# Patient Record
Sex: MALE | Race: WHITE | NOT HISPANIC OR LATINO
[De-identification: names, ages, dates, MRNs, and addresses within clinical notes are randomized per-mention and may not be internally consistent; named-entity substitution may affect disease eponyms.]

---

## 2017-11-14 ENCOUNTER — APPOINTMENT (OUTPATIENT)
Dept: DERMATOLOGY | Facility: CLINIC | Age: 62
End: 2017-11-14

## 2017-11-30 ENCOUNTER — RX RENEWAL (OUTPATIENT)
Age: 62
End: 2017-11-30

## 2021-05-26 ENCOUNTER — NON-APPOINTMENT (OUTPATIENT)
Age: 66
End: 2021-05-26

## 2021-06-15 ENCOUNTER — APPOINTMENT (OUTPATIENT)
Dept: INTERNAL MEDICINE | Facility: CLINIC | Age: 66
End: 2021-06-15
Payer: MEDICARE

## 2021-06-15 VITALS
TEMPERATURE: 97.8 F | SYSTOLIC BLOOD PRESSURE: 120 MMHG | HEIGHT: 70 IN | BODY MASS INDEX: 28.49 KG/M2 | OXYGEN SATURATION: 97 % | DIASTOLIC BLOOD PRESSURE: 74 MMHG | HEART RATE: 81 BPM | WEIGHT: 199 LBS

## 2021-06-15 DIAGNOSIS — F12.90 CANNABIS USE, UNSPECIFIED, UNCOMPLICATED: ICD-10-CM

## 2021-06-15 DIAGNOSIS — F17.200 NICOTINE DEPENDENCE, UNSPECIFIED, UNCOMPLICATED: ICD-10-CM

## 2021-06-15 DIAGNOSIS — Z78.9 OTHER SPECIFIED HEALTH STATUS: ICD-10-CM

## 2021-06-15 DIAGNOSIS — M51.36 OTHER INTERVERTEBRAL DISC DEGENERATION, LUMBAR REGION: ICD-10-CM

## 2021-06-15 DIAGNOSIS — Z87.891 PERSONAL HISTORY OF NICOTINE DEPENDENCE: ICD-10-CM

## 2021-06-15 DIAGNOSIS — M54.30 SCIATICA, UNSPECIFIED SIDE: ICD-10-CM

## 2021-06-15 DIAGNOSIS — J39.8 OTHER SPECIFIED DISEASES OF UPPER RESPIRATORY TRACT: ICD-10-CM

## 2021-06-15 DIAGNOSIS — S42.301A UNSPECIFIED FRACTURE OF SHAFT OF HUMERUS, RIGHT ARM, INITIAL ENCOUNTER FOR CLOSED FRACTURE: ICD-10-CM

## 2021-06-15 DIAGNOSIS — Z09 ENCOUNTER FOR FOLLOW-UP EXAMINATION AFTER COMPLETED TREATMENT FOR CONDITIONS OTHER THAN MALIGNANT NEOPLASM: ICD-10-CM

## 2021-06-15 PROCEDURE — 99204 OFFICE O/P NEW MOD 45 MIN: CPT

## 2021-06-17 PROBLEM — M54.30 SCIATICA, UNSPECIFIED LATERALITY: Status: ACTIVE | Noted: 2021-06-17

## 2021-06-17 PROBLEM — Z78.9 ALCOHOL INGESTION: Status: ACTIVE | Noted: 2021-06-17

## 2021-06-17 PROBLEM — S42.301A RIGHT ARM FRACTURE: Status: RESOLVED | Noted: 2021-06-17 | Resolved: 2021-06-17

## 2021-06-17 PROBLEM — Z87.891 FORMER SMOKER: Status: ACTIVE | Noted: 2021-06-17

## 2021-06-17 PROBLEM — J39.8 TRACHEAL DEVIATION: Status: ACTIVE | Noted: 2021-06-17

## 2021-06-17 PROBLEM — M51.36 LUMBAR DEGENERATIVE DISC DISEASE: Status: ACTIVE | Noted: 2021-06-17

## 2021-06-17 PROBLEM — F12.90 USES MARIJUANA: Status: ACTIVE | Noted: 2021-06-17

## 2021-06-17 PROBLEM — J39.8 TRACHEAL STENOSIS: Status: ACTIVE | Noted: 2021-06-17

## 2021-06-17 RX ORDER — TADALAFIL 5 MG/1
5 TABLET ORAL
Refills: 0 | Status: ACTIVE | COMMUNITY

## 2021-06-17 NOTE — PHYSICAL EXAM
[No Acute Distress] : no acute distress [Well Nourished] : well nourished [Well Groomed] : well groomed [Well Developed] : well developed [Normal Oropharynx] : normal oropharynx [Supple] : supple [No JVD] : no jvd [Normal Rate/Rhythm] : normal rate/rhythm [Normal S1, S2] : normal s1, s2 [No Resp Distress] : no resp distress [No Acc Muscle Use] : no acc muscle use [Normal Rhythm and Effort] : normal rhythm and effort [Clear to Auscultation Bilaterally] : clear to auscultation bilaterally [Benign] : benign [Normal Gait] : normal gait [No Clubbing] : no clubbing [No Cyanosis] : no cyanosis [No Edema] : no edema [Normal Color/ Pigmentation] : normal color/ pigmentation [No Focal Deficits] : no focal deficits [Oriented x3] : oriented x3 [Normal Affect] : normal affect [TextBox_11] : No ST; PRL EOMI; TM's clear; wearing full face mask [TextBox_44] : no stridor [TextBox_54] : no cords [TextBox_68] : R=16; good air entry with no wheezing

## 2021-06-17 NOTE — PROCEDURE
[FreeTextEntry1] : See scanned chest CT report from 5/18/21 \par Reviewed in detail with patient and wife and all questions answered

## 2021-06-17 NOTE — REVIEW OF SYSTEMS
[Cough] : cough [SOB on Exertion] : sob on exertion [Chest Discomfort] : chest discomfort [Back Pain] : back pain [Negative] : Endocrine

## 2021-06-17 NOTE — HISTORY OF PRESENT ILLNESS
[Former] : former [>= 30 pack years] : >= 30 pack years [Never] : never [Current] : current [Cough] : coughing [Wheezing] : wheezing [Nasal Passage Blockage (Stuffiness)] : edema [Nonspecific Pain, Swelling, And Stiffness] : chest pain [Fever] : fever [Difficulty Breathing During Exertion] : dyspnea on exertion [Feelings Of Weakness On Exertion] : exercise intolerance [Does not check] : The patient is not checking peak flow at home [2  -  Slight] : 2, slight [Class II - Mild Symptoms and Slight Limitations] : II [With Patient/Caregiver] : With Patient/Caregiver [Designated Healthcare Proxy] : Designated healthcare proxy [Name: ___] : Health Care Proxy's Name: [unfilled]  [Relationship: ___] : Relationship: [unfilled] [Wt Gain ___ kg] : No recent weight gain [Wt Loss ___ kg] : No recent weight loss [More Frequent Use Needed Recently] : Patient reports no recent increase in frequency of [TextBox_4] : The patient is a 66-year-old man who recently had a baseline chest CT which was abnormal.  He is here today to review and discuss evaluation and management.\par He denies any history of pneumonia, pleurisy, bronchitis, asthma, emphysema, tuberculosis, lung cancer, pneumothorax, sleep apnea, or DVT/pulmonary embolism.  He denies any prior history of lung surgery.  He reports that his father had lung cancer.  The patient is  and retired.  He worked in the past as a  and then at [employer].  He denies any exposure to radiation or chemicals.  He thinks that he may have had exposure to dusts in his work at [employer].  The patient reports that he smoked at least 1 pack/day of cigarettes from age 13 until 1982.  He then discontinued smoking until 2001.  He then resumed smoking 1 pack/day of cigarettes for at least 5 to 6 years.  He then discontinued smoking again.  He drinks alcohol daily and does use marijuana.  He has five healthy cats and a dog in his home.  He has not had any recent travel.  He believes that his last chest x-ray was 25 years ago.  He has not had PFTs.  He reports that this is his first chest CT.  He did receive Covid vaccination but has not had influenza or pneumococcal vaccination.\par He reports that he was having left muscular chest pain.  His primary care physician decided to do a chest CT to evaluate the issue and because of his smoking history.  Presently he denies any chest pain.  He denies any significant cough.  He has not had hemoptysis.  He denies any weight loss.  He denies any recent fevers/chills/sweats.  He denies any calf pain.  He denies any edema, orthopnea, or PND.  He does notice reduced exercise tolerance and mild dyspnea with heavy exertion. [TextBox_11] : 1 [TextBox_13] : 30 [YearQuit] : 2007 [de-identified] : denies hemoptysis [ESS] : 0 [TextBox_42] : 05/21 [TextBox_52] : 1 [AdvancecareDate] : 06/21

## 2021-06-17 NOTE — REASON FOR VISIT
[Initial] : an initial visit [Abnormal CXR/ Chest CT] : an abnormal CXR/ chest CT [Shortness of Breath] : shortness of breath [Spouse] : spouse [TextBox_13] : Dr. Jasmine

## 2021-06-17 NOTE — CURRENT MEDS
Patient Education     The Flu (Influenza)     The virus that causes the flu spreads through the air in droplets when someone who has the flu coughs, sneezes, laughs, or talks.   The flu (influenza) is an infection that affects your respiratory tract. This tract is made up of your mouth, nose, and lungs, and the passages between them. Unlike a cold, the flu can make you very ill. And it can lead to pneumonia, a serious lung infection. The flu can have serious complications and even cause death.  Who is at risk for the flu?  Anyone can get the flu. But you are more likely to become infected if you:  · Have a weakened immune system  · Work in a healthcare setting where you may be exposed to flu germs  · Live or work with someone who has the flu  · Haven’t had an annual flu shot  How does the flu spread?  The flu is caused by a virus. The virus spreads through the air in droplets when someone who has the flu coughs, sneezes, laughs, or talks. You can become infected when you inhale these viruses directly. You can also become infected when you touch a surface on which the droplets have landed and then transfer the germs to your eyes, nose, or mouth. Touching used tissues, or sharing utensils, drinking glasses, or a toothbrush from an infected person can expose you to flu viruses, too.  What are the symptoms of the flu?  Flu symptoms tend to come on quickly and may last a few days to a few weeks. They include:  · Fever usually higher than 100.4°F  (38°C) and chills  · Sore throat and headache  · Dry cough  · Runny nose  · Tiredness and weakness  · Muscle aches  Who is at risk for flu complications?  For some people, the flu can be very serious. The risk for complications is greater for:  · Children younger than age 5  · Adults ages 65 and older  · People with a chronic illness such as diabetes or heart, kidney, or lung disease  · People who live in a nursing home or long-term care facility   How is the flu treated?  The  [Takes medication as prescribed] : takes flu usually gets better after 7 days or so. In some cases, your healthcare provider may prescribe an antiviral medicine. This may help you get well a little sooner. For the medicine to help, you need to take it as soon as possible (ideally within 48 hours) after your symptoms start. If you develop pneumonia or other serious illness, you may need to stay in the hospital.  Easing flu symptoms  · Drink lots of fluids such as water, juice, and warm soup. A good rule is to drink enough so that you urinate your normal amount.  · Get plenty of rest.  · Ask your healthcare provider what to take for fever and pain.  · Call your provider if your fever is 100.4°F (38°C) or higher, or you become dizzy, lightheaded, or short of breath.  Taking steps to protect others  · Wash your hands often, especially after coughing or sneezing. Or clean your hands with an alcohol-based hand  containing at least 60% alcohol.  · Cough or sneeze into a tissue. Then throw the tissue away and wash your hands. If you don’t have a tissue, cough and sneeze into your elbow.  · Stay home until at least 24 hours after you no longer have a fever or chills. Be sure the fever isn’t being hidden by fever-reducing medicine.  · Don’t share food, utensils, drinking glasses, or a toothbrush with others.  · Ask your healthcare provider if others in your household should get antiviral medicine to help them avoid infection.  How can the flu be prevented?  · One of the best ways to avoid the flu is to get a flu vaccine each year. The virus that causes the flu changes from year to year. For that reason, healthcare providers recommend getting the flu vaccine each year, as soon as it's available in your area. The vaccine is given as a shot. Your healthcare provider can tell you which vaccine is right for you. The nasal spray is not recommended for the 0777-1347 flu season. The CDC says the nasal spray did not seem to protect against the flu over the last  [None] : Patient does not have any barriers to medication adherence several flu seasons.  · Wash your hands often. Frequent handwashing is a proven way to help prevent infection.  · Carry an alcohol-based hand gel containing at least 60% alcohol. Use it when you can't use soap and water. Then wash your hands as soon as you can.  · Avoid touching your eyes, nose, and mouth.  · At home and work, clean phones, computer keyboards, and toys often with disinfectant wipes.  · If possible, avoid close contact with others who have the flu or symptoms of the flu.  Handwashing tips  Handwashing is one of the best ways to prevent many common infections. If you are caring for or visiting someone with the flu, wash your hands each time you enter and leave the room. Follow these steps:  · Use warm water and plenty of soap. Rub your hands together well.  · Clean the whole hand, including under your nails, between your fingers, and up the wrists.  · Wash for at least 15 seconds.  · Rinse, letting the water run down your fingers, not up your wrists.  · Dry your hands well. Use a paper towel to turn off the faucet and open the door.  Using alcohol-based hand   Alcohol-based hand  are also a good choice. Use them when you can't use soap and water. Follow these steps:  · Squeeze about a tablespoon of gel into the palm of one hand.  · Rub your hands together briskly, cleaning the backs of your hands, the palms, between your fingers, and up the wrists.  · Rub until the gel is gone and your hands are completely dry.  Preventing the flu in healthcare settings  The flu is a special concern for people in hospitals and long-term care facilities. To help prevent the spread of flu, many hospitals and nursing homes take these steps:  · Healthcare providers wash their hands or use an alcohol-based hand  before and after treating each patient.  · People with the flu have private rooms and bathrooms or share a room with someone with the same infection.  · People who are at high risk for the  [Yes] : Reviewed medication list for presence of high-risk medications. flu but don't have it are encouraged to get the flu and pneumonia vaccines.  · All healthcare workers are encouraged or required to get flu shots.   Date Last Reviewed: 12/1/2016  © 7211-0924 [employer]. [address] [place] [address], [place] [address]. All rights reserved. This information is not intended as a substitute for professional medical care. Always follow your healthcare professional's instructions.         follow up with Chauncey Chavarria MD as needed     [Muscle Relaxants] : muscle relaxants

## 2021-06-17 NOTE — ASSESSMENT
[FreeTextEntry1] : Multiple lung nodules\par ?  Etiology\par ?  Malignancy given strong smoking history\par ?  Postinfectious\par ?  Post occupational exposure\par No evidence of collagen vascular disease\par Will review chest CT CD with Dr. Hudson of radiology\par Schedule PET scan\par Follow-up chest CT in 3 months\par Collect any sputum for culture\par Q gold testing\par \par COPD/emphysema\par Evidence of emphysema on CT scanning\par Arrange full PFTs and 6-minute walk test\par No longer smokes\par Should have annual influenza vaccine/needs pneumococcal vaccination\par Had Covid vaccinations\par Pulmonary rehab\par Weight control\par Carry albuterol MDI\par Will likely need Trelegy Ellipta or Anoro Ellipta after PFT testing\par \par Has evidence of thyromegaly on chest CT triggering tracheal deviation and tracheal stenosis/stricture\par Doing ultrasound of thyroid\par Will likely need endocrine follow-up\par Check TFTs\par To do full PFTs to assess for upper  airway obstruction\par ENT evaluation\par May need surgical resection\par \par Esophageal dilatation\par Also has evidence of esophageal wall thickening\par Needs to see GI for upper endoscopy\par Will need follow-up of thyromegaly also causing esophageal deviation and compression\par May need evaluation for esophageal motility\par Antireflux diet and precautions\par Aspiration precautions\par Can use famotidine daily to twice daily\par \par Coronary artery calcifications\par ?  Significant CAD\par To see cardiology\par Should have echo and stress testing\par \par He will return in follow-up in 2 weeks and as needed\par He will call if his status worsens\par He will call for any pulmonary issues\par All of the above was discussed in detail with the patient and his wife and all of their questions were answered\par GI referrals were given at their request\par They verbally confirmed understanding of all of the above and agreement with the above plan

## 2021-06-18 ENCOUNTER — OUTPATIENT (OUTPATIENT)
Dept: OUTPATIENT SERVICES | Facility: HOSPITAL | Age: 66
LOS: 1 days | End: 2021-06-18
Payer: MEDICARE

## 2021-06-18 ENCOUNTER — APPOINTMENT (OUTPATIENT)
Dept: NUCLEAR MEDICINE | Facility: CLINIC | Age: 66
End: 2021-06-18
Payer: MEDICARE

## 2021-06-18 ENCOUNTER — TRANSCRIPTION ENCOUNTER (OUTPATIENT)
Age: 66
End: 2021-06-18

## 2021-06-18 DIAGNOSIS — Z90.89 ACQUIRED ABSENCE OF OTHER ORGANS: Chronic | ICD-10-CM

## 2021-06-18 DIAGNOSIS — J43.9 EMPHYSEMA, UNSPECIFIED: ICD-10-CM

## 2021-06-18 DIAGNOSIS — I86.1 SCROTAL VARICES: Chronic | ICD-10-CM

## 2021-06-18 DIAGNOSIS — R91.8 OTHER NONSPECIFIC ABNORMAL FINDING OF LUNG FIELD: ICD-10-CM

## 2021-06-18 PROCEDURE — 78815 PET IMAGE W/CT SKULL-THIGH: CPT | Mod: 26,PI,MH

## 2021-06-18 PROCEDURE — 78815 PET IMAGE W/CT SKULL-THIGH: CPT

## 2021-06-18 PROCEDURE — A9552: CPT

## 2021-06-22 ENCOUNTER — APPOINTMENT (OUTPATIENT)
Dept: INTERNAL MEDICINE | Facility: CLINIC | Age: 66
End: 2021-06-22
Payer: MEDICARE

## 2021-06-22 ENCOUNTER — NON-APPOINTMENT (OUTPATIENT)
Age: 66
End: 2021-06-22

## 2021-06-22 PROCEDURE — 99441: CPT | Mod: 95

## 2021-06-24 ENCOUNTER — APPOINTMENT (OUTPATIENT)
Dept: GASTROENTEROLOGY | Facility: CLINIC | Age: 66
End: 2021-06-24
Payer: MEDICARE

## 2021-06-24 VITALS
BODY MASS INDEX: 27.06 KG/M2 | SYSTOLIC BLOOD PRESSURE: 134 MMHG | WEIGHT: 189 LBS | HEART RATE: 95 BPM | HEIGHT: 70 IN | DIASTOLIC BLOOD PRESSURE: 90 MMHG

## 2021-06-24 DIAGNOSIS — K22.8 OTHER SPECIFIED DISEASES OF ESOPHAGUS: ICD-10-CM

## 2021-06-24 PROCEDURE — 99204 OFFICE O/P NEW MOD 45 MIN: CPT

## 2021-06-24 NOTE — ASSESSMENT
[FreeTextEntry1] : Abnormal PET scan and CAT scan suggesting erythema of the, esophagus. We'll schedule upper endoscopy. He has had acologard recently negative however as a smoker. He has been advised to consider a regular colonoscopy

## 2021-06-24 NOTE — HISTORY OF PRESENT ILLNESS
[FreeTextEntry1] : Patient recently had a CAT scan of his lungs for the purpose of screening as a ex-smoker and there was moderate mural thickening of portions of the esophagus. At that CT was then done, which also showed activity in the distal esophagus. He denies dysphasia. He denies regurgitation. He does eat close to that time and does not have significant heartburn. He denies any melena or weight loss

## 2021-06-28 ENCOUNTER — RESULT REVIEW (OUTPATIENT)
Age: 66
End: 2021-06-28

## 2021-06-28 ENCOUNTER — APPOINTMENT (OUTPATIENT)
Dept: GASTROENTEROLOGY | Facility: AMBULATORY MEDICAL SERVICES | Age: 66
End: 2021-06-28
Payer: MEDICARE

## 2021-06-28 PROCEDURE — 43239 EGD BIOPSY SINGLE/MULTIPLE: CPT

## 2021-07-09 ENCOUNTER — LABORATORY RESULT (OUTPATIENT)
Age: 66
End: 2021-07-09

## 2021-07-09 ENCOUNTER — APPOINTMENT (OUTPATIENT)
Dept: DISASTER EMERGENCY | Facility: CLINIC | Age: 66
End: 2021-07-09

## 2021-07-09 NOTE — ASU PATIENT PROFILE, ADULT - CENTRAL VENOUS CATHETER
Patient Education        Elevated Blood Pressure: Care Instructions  Your Care Instructions    Blood pressure is a measure of how hard the blood pushes against the walls of your arteries. It's normal for blood pressure to go up and down throughout the day. But if it stays up over time, you have high blood pressure. Two numbers tell you your blood pressure. The first number is the systolic pressure. It shows how hard the blood pushes when your heart is pumping. The second number is the diastolic pressure. It shows how hard the blood pushes between heartbeats, when your heart is relaxed and filling with blood. An ideal blood pressure in adults is less than 120/80 (say \"120 over 80\"). High blood pressure is 140/90 or higher. You have high blood pressure if your top number is 140 or higher or your bottom number is 90 or higher, or both. The main test for high blood pressure is simple, fast, and painless. To diagnose high blood pressure, your doctor will test your blood pressure at different times. After testing your blood pressure, your doctor may ask you to test it again when you are home. If you are diagnosed with high blood pressure, you can work with your doctor to make a long-term plan to manage it. Follow-up care is a key part of your treatment and safety. Be sure to make and go to all appointments, and call your doctor if you are having problems. It's also a good idea to know your test results and keep a list of the medicines you take. How can you care for yourself at home? · Do not smoke. Smoking increases your risk for heart attack and stroke. If you need help quitting, talk to your doctor about stop-smoking programs and medicines. These can increase your chances of quitting for good. · Stay at a healthy weight. · Try to limit how much sodium you eat to less than 2,300 milligrams (mg) a day. Your doctor may ask you to try to eat less than 1,500 mg a day. · Be physically active. Get at least 30 minutes of exercise on most days of the week. Walking is a good choice. You also may want to do other activities, such as running, swimming, cycling, or playing tennis or team sports. · Avoid or limit alcohol. Talk to your doctor about whether you can drink any alcohol. · Eat plenty of fruits, vegetables, and low-fat dairy products. Eat less saturated and total fats. · Learn how to check your blood pressure at home. When should you call for help? Call your doctor now or seek immediate medical care if:    · Your blood pressure is much higher than normal (such as 180/110 or higher).     · You think high blood pressure is causing symptoms such as:  ¨ Severe headache. ¨ Blurry vision.    Watch closely for changes in your health, and be sure to contact your doctor if:    · You do not get better as expected. Where can you learn more? Go to https://[employer]-fi.[employer]. org and sign in to your [employer] account. Enter L229 in the [employer] box to learn more about \"Elevated Blood Pressure: Care Instructions. \"     If you do not have an account, please click on the \"Sign Up Now\" link. Current as of: May 10, 2017  Content Version: 11.6  © 2947-4106 [employer]. Care instructions adapted under license by [facility] ([facility]). If you have questions about a medical condition or this instruction, always ask your healthcare professional. [place] [address] any warranty or liability for your use of this information.        Patient Education no

## 2021-07-09 NOTE — ASU PATIENT PROFILE, ADULT - PSH
S/P tonsillectomy  3y/o  Varicocele  right 1981   H/O inguinal hernia  2016  S/P tonsillectomy  5y/o  Varicocele  right 1981

## 2021-07-09 NOTE — ASU PATIENT PROFILE, ADULT - PMH
Benign prostatic hyperplasia    Coronary artery calcification    DDD (degenerative disc disease), lumbar    Emphysema lung    Esophageal dilatation    Inguinal hernia    Right arm fracture    Sciatica    Testosterone deficiency    Thyromegaly    Tracheal deviation    Tracheal stenosis

## 2021-07-12 ENCOUNTER — APPOINTMENT (OUTPATIENT)
Dept: GASTROENTEROLOGY | Facility: HOSPITAL | Age: 66
End: 2021-07-12
Payer: MEDICARE

## 2021-07-12 ENCOUNTER — OUTPATIENT (OUTPATIENT)
Dept: INPATIENT UNIT | Facility: HOSPITAL | Age: 66
LOS: 1 days | Discharge: HOME | End: 2021-07-12
Payer: MEDICARE

## 2021-07-12 VITALS
TEMPERATURE: 97 F | SYSTOLIC BLOOD PRESSURE: 108 MMHG | OXYGEN SATURATION: 98 % | RESPIRATION RATE: 16 BRPM | HEART RATE: 82 BPM | DIASTOLIC BLOOD PRESSURE: 72 MMHG

## 2021-07-12 VITALS
RESPIRATION RATE: 16 BRPM | HEART RATE: 81 BPM | WEIGHT: 188.5 LBS | SYSTOLIC BLOOD PRESSURE: 121 MMHG | TEMPERATURE: 97 F | DIASTOLIC BLOOD PRESSURE: 81 MMHG | HEIGHT: 70 IN | OXYGEN SATURATION: 100 %

## 2021-07-12 DIAGNOSIS — I86.1 SCROTAL VARICES: Chronic | ICD-10-CM

## 2021-07-12 DIAGNOSIS — Z90.89 ACQUIRED ABSENCE OF OTHER ORGANS: Chronic | ICD-10-CM

## 2021-07-12 DIAGNOSIS — K22.8 OTHER SPECIFIED DISEASES OF ESOPHAGUS: ICD-10-CM

## 2021-07-12 DIAGNOSIS — Z87.19 PERSONAL HISTORY OF OTHER DISEASES OF THE DIGESTIVE SYSTEM: Chronic | ICD-10-CM

## 2021-07-12 LAB
ANION GAP SERPL CALC-SCNC: 3 MMOL/L — LOW (ref 5–17)
BUN SERPL-MCNC: 15 MG/DL — SIGNIFICANT CHANGE UP (ref 7–23)
CALCIUM SERPL-MCNC: 9.5 MG/DL — SIGNIFICANT CHANGE UP (ref 8.5–10.1)
CHLORIDE SERPL-SCNC: 108 MMOL/L — SIGNIFICANT CHANGE UP (ref 96–108)
CO2 SERPL-SCNC: 27 MMOL/L — SIGNIFICANT CHANGE UP (ref 22–31)
CREAT SERPL-MCNC: 1.38 MG/DL — HIGH (ref 0.5–1.3)
GLUCOSE SERPL-MCNC: 106 MG/DL — HIGH (ref 70–99)
HCT VFR BLD CALC: 50.5 % — HIGH (ref 39–50)
HGB BLD-MCNC: 16.8 G/DL — SIGNIFICANT CHANGE UP (ref 13–17)
MCHC RBC-ENTMCNC: 29.4 PG — SIGNIFICANT CHANGE UP (ref 27–34)
MCHC RBC-ENTMCNC: 33.3 GM/DL — SIGNIFICANT CHANGE UP (ref 32–36)
MCV RBC AUTO: 88.4 FL — SIGNIFICANT CHANGE UP (ref 80–100)
PLATELET # BLD AUTO: 313 K/UL — SIGNIFICANT CHANGE UP (ref 150–400)
POTASSIUM SERPL-MCNC: 5.1 MMOL/L — SIGNIFICANT CHANGE UP (ref 3.5–5.3)
POTASSIUM SERPL-SCNC: 5.1 MMOL/L — SIGNIFICANT CHANGE UP (ref 3.5–5.3)
RBC # BLD: 5.71 M/UL — SIGNIFICANT CHANGE UP (ref 4.2–5.8)
RBC # FLD: 13.4 % — SIGNIFICANT CHANGE UP (ref 10.3–14.5)
SODIUM SERPL-SCNC: 138 MMOL/L — SIGNIFICANT CHANGE UP (ref 135–145)
WBC # BLD: 8.44 K/UL — SIGNIFICANT CHANGE UP (ref 3.8–10.5)
WBC # FLD AUTO: 8.44 K/UL — SIGNIFICANT CHANGE UP (ref 3.8–10.5)

## 2021-07-12 PROCEDURE — 85027 COMPLETE CBC AUTOMATED: CPT

## 2021-07-12 PROCEDURE — 80048 BASIC METABOLIC PNL TOTAL CA: CPT

## 2021-07-12 PROCEDURE — 43259 EGD US EXAM DUODENUM/JEJUNUM: CPT

## 2021-07-12 PROCEDURE — C9399: CPT

## 2021-07-12 PROCEDURE — 36415 COLL VENOUS BLD VENIPUNCTURE: CPT

## 2021-07-12 RX ORDER — SODIUM CHLORIDE 9 MG/ML
1000 INJECTION, SOLUTION INTRAVENOUS
Refills: 0 | Status: DISCONTINUED | OUTPATIENT
Start: 2021-07-12 | End: 2021-07-12

## 2021-07-12 RX ORDER — OXYCODONE HYDROCHLORIDE 5 MG/1
5 TABLET ORAL ONCE
Refills: 0 | Status: DISCONTINUED | OUTPATIENT
Start: 2021-07-12 | End: 2021-07-12

## 2021-07-12 RX ORDER — FENTANYL CITRATE 50 UG/ML
50 INJECTION INTRAVENOUS
Refills: 0 | Status: DISCONTINUED | OUTPATIENT
Start: 2021-07-12 | End: 2021-07-12

## 2021-07-12 RX ORDER — ONDANSETRON 8 MG/1
4 TABLET, FILM COATED ORAL ONCE
Refills: 0 | Status: DISCONTINUED | OUTPATIENT
Start: 2021-07-12 | End: 2021-07-12

## 2021-07-12 NOTE — BRIEF OPERATIVE NOTE - OPERATION/FINDINGS
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 On retroflexion and forward view it does not extend past 42 cm.   EUS stage T3N1.

## 2021-07-13 ENCOUNTER — APPOINTMENT (OUTPATIENT)
Dept: INTERNAL MEDICINE | Facility: CLINIC | Age: 66
End: 2021-07-13
Payer: MEDICARE

## 2021-07-13 VITALS
BODY MASS INDEX: 28.58 KG/M2 | SYSTOLIC BLOOD PRESSURE: 100 MMHG | WEIGHT: 193 LBS | TEMPERATURE: 98 F | OXYGEN SATURATION: 97 % | HEART RATE: 82 BPM | DIASTOLIC BLOOD PRESSURE: 68 MMHG | HEIGHT: 69 IN

## 2021-07-13 DIAGNOSIS — I25.84 ATHEROSCLEROTIC HEART DISEASE OF NATIVE CORONARY ARTERY W/OUT ANGINA PECTORIS: ICD-10-CM

## 2021-07-13 DIAGNOSIS — I25.10 ATHEROSCLEROTIC HEART DISEASE OF NATIVE CORONARY ARTERY W/OUT ANGINA PECTORIS: ICD-10-CM

## 2021-07-13 DIAGNOSIS — E01.0 IODINE-DEFICIENCY RELATED DIFFUSE (ENDEMIC) GOITER: ICD-10-CM

## 2021-07-13 DIAGNOSIS — R91.8 OTHER NONSPECIFIC ABNORMAL FINDING OF LUNG FIELD: ICD-10-CM

## 2021-07-13 PROCEDURE — 94726 PLETHYSMOGRAPHY LUNG VOLUMES: CPT

## 2021-07-13 PROCEDURE — 94010 BREATHING CAPACITY TEST: CPT

## 2021-07-13 PROCEDURE — 99214 OFFICE O/P EST MOD 30 MIN: CPT | Mod: 25

## 2021-07-13 PROCEDURE — 94729 DIFFUSING CAPACITY: CPT

## 2021-07-13 RX ORDER — LISDEXAMFETAMINE DIMESYLATE 70 MG/1
70 CAPSULE ORAL
Qty: 30 | Refills: 0 | Status: ACTIVE | COMMUNITY
Start: 2021-06-22

## 2021-07-13 RX ORDER — CYCLOBENZAPRINE HYDROCHLORIDE 10 MG/1
10 TABLET, FILM COATED ORAL
Refills: 0 | Status: DISCONTINUED | COMMUNITY
End: 2021-07-13

## 2021-07-14 PROBLEM — I25.10 CORONARY ARTERY CALCIFICATION: Status: ACTIVE | Noted: 2021-06-17

## 2021-07-14 PROBLEM — E01.0 THYROMEGALY: Status: ACTIVE | Noted: 2021-06-17

## 2021-07-14 PROBLEM — R91.8 MULTIPLE LUNG NODULES: Status: ACTIVE | Noted: 2021-06-15

## 2021-07-14 NOTE — CURRENT MEDS
[Takes medication as prescribed] : takes [None] : Patient does not have any barriers to medication adherence [Yes] : Reviewed medication list for presence of high-risk medications. [Muscle Relaxants] : muscle relaxants [Opioids] : opioids

## 2021-07-14 NOTE — PHYSICAL EXAM
[No Acute Distress] : no acute distress [Well Nourished] : well nourished [Well Groomed] : well groomed [Well Developed] : well developed [Supple] : supple [No JVD] : no jvd [Normal Rate/Rhythm] : normal rate/rhythm [Normal S1, S2] : normal s1, s2 [No Resp Distress] : no resp distress [No Acc Muscle Use] : no acc muscle use [Normal Rhythm and Effort] : normal rhythm and effort [Clear to Auscultation Bilaterally] : clear to auscultation bilaterally [Benign] : benign [Normal Gait] : normal gait [No Clubbing] : no clubbing [No Cyanosis] : no cyanosis [No Edema] : no edema [No Focal Deficits] : no focal deficits [Oriented x3] : oriented x3 [Normal Affect] : normal affect [TextBox_11] : No ST; PRL EOMI; TM's clear; wearing full face mask [TextBox_44] : no stridor [TextBox_54] : no cords [TextBox_68] : R=16; good air entry with no wheezing [TextBox_125] : Tanned

## 2021-07-14 NOTE — REASON FOR VISIT
[Abnormal CXR/ Chest CT] : an abnormal CXR/ chest CT [Shortness of Breath] : shortness of breath [Spouse] : spouse [Follow-Up] : a follow-up visit [TextBox_13] : Dr. Jasmine

## 2021-07-14 NOTE — PROCEDURE
[FreeTextEntry1] : See scanned chest CT report from 5/18/21 \par See scanned PET scan report\par See scanned PFT report\par Reviewed in detail with patient and wife and all questions answered

## 2021-07-14 NOTE — ASSESSMENT
[FreeTextEntry1] : Multiple lung nodules\par ?  Etiology\par ?  Malignancy given strong smoking history/ esophageal cancer\par ?  Postinfectious\par ?  Post occupational exposure\par No evidence of collagen vascular disease\par Will review chest CT CD with Dr. Hudson of radiology\par Had PET = nodules below resolution of scan\par Follow-up chest CT in 3 months\par Collect any sputum for culture\par Q gold testing\par \par COPD/emphysema\par Evidence of emphysema on CT scanning\par See scanned PFT's from today = copy of report given to patient\par To do PFT's at least annually\par No longer smokes\par Should have annual influenza vaccine/needs pneumococcal vaccination\par Had Covid vaccinations\par Pulmonary rehab\par Weight control\par Carry albuterol MDI\par Declines any standing therapy given normal PFT's\par \par Had evidence of thyromegaly on chest CT triggering tracheal deviation\par Found to have thyroid cyst on US - drained with resolution of tracheal/esophageal deviation as per patient\par PFT's done today with no sign of UAWO\par Endocrine follow-up\par Monitor thyroid US and TFT's\par \par Esophageal dilatation/esophageal wall thickening on CT and PET\par Saw GI\par Had EGD and EUS\par Now with esophageal cancer\par Chemo/RT/[place] [facility] [place]\par Antireflux diet and precautions\par Aspiration precautions\par On PPI as per GI\par \par Coronary artery calcifications\par ?  Significant CAD\par To see cardiology\par Should have echo and stress testing\par \par He will return in follow-up after his chest CT and as needed = can do TEB visit\par He will call if his status worsens\par He will call for any pulmonary issues\par All of the above was discussed in detail with the patient and his wife and all of their questions were answered\par They verbally confirmed understanding of all of the above and agreement with the above plan

## 2021-07-14 NOTE — REVIEW OF SYSTEMS
[SOB on Exertion] : sob on exertion [Chest Discomfort] : chest discomfort [Back Pain] : back pain [Negative] : Endocrine [Thyroid Problem] : thyroid problem

## 2021-07-14 NOTE — HISTORY OF PRESENT ILLNESS
[Former] : former [>= 30 pack years] : >= 30 pack years [Never] : never [Current] : current [Cough] : coughing [Wheezing] : wheezing [Nasal Passage Blockage (Stuffiness)] : edema [Nonspecific Pain, Swelling, And Stiffness] : chest pain [Fever] : fever [Does not check] : The patient is not checking peak flow at home [2  -  Slight] : 2, slight [Class II - Mild Symptoms and Slight Limitations] : II [Designated Healthcare Proxy] : Designated healthcare proxy [Name: ___] : Health Care Proxy's Name: [unfilled]  [Relationship: ___] : Relationship: [unfilled] [Difficulty Breathing During Exertion] : dyspnea on exertion [Feelings Of Weakness On Exertion] : exercise intolerance [Reviewed no changes] : Reviewed no changes [Wt Gain ___ kg] : No recent weight gain [Wt Loss ___ kg] : No recent weight loss [More Frequent Use Needed Recently] : Patient reports no recent increase in frequency of [TextBox_4] : INITIAL VISIT:\par The patient is a 66-year-old man who recently had a baseline chest CT which was abnormal.\par He denies any history of pneumonia, pleurisy, bronchitis, asthma, emphysema, tuberculosis, lung cancer, pneumothorax, sleep apnea, or DVT/pulmonary embolism.  He denies any prior history of lung surgery.  He reports that his father had lung cancer.  The patient is  and retired.  He worked in the past as a  and then at [employer].  He denies any exposure to radiation or chemicals.  He thinks that he may have had exposure to dusts in his work at [employer].  The patient reports that he smoked at least 1 pack/day of cigarettes from age 13 until 1982.  He then discontinued smoking until 2001.  He then resumed smoking 1 pack/day of cigarettes for at least 5 to 6 years.  He then discontinued smoking again.  He drinks alcohol daily and does use marijuana.  He has five healthy cats and a dog in his home.  He has not had any recent travel.  He believes that his last chest x-ray was 25 years ago.  He has not had PFTs.  He reports that this is his first chest CT.  He did receive Covid vaccination but has not had influenza or pneumococcal vaccination.\par He reports that he was having left muscular chest pain.  His primary care physician decided to do a chest CT to evaluate the issue and because of his smoking history.  Presently he denies any chest pain.  He denies any significant cough.  He has not had hemoptysis.  He denies any weight loss.  He denies any recent fevers/chills/sweats.  He denies any calf pain.  He denies any edema, orthopnea, or PND.  He does notice reduced exercise tolerance and mild dyspnea with heavy exertion.\par \par 7/13/21 F/U visit\par Recently saw endocrine.  [place] to have large thyroid cyst which was drained and no longer impinging upon esophagus or trachea.\par Had abnormal PET scan.  Was evaluated by GI.  Underwent upper endoscopy and endoscopic ultrasound.  Has been diagnosed with esophageal cancer.  Will be going to [facility] on Thursday.  Reports that he will need radiation and chemotherapy followed by surgical resection.\par His left chest pain was likely due to a rib fracture in that area.  He reports that his pain is improved.  He denies any cough or hemoptysis.  He denies any fevers.  He denies any edema, orthopnea, or PND.  He denies any new calf pain.  His dyspnea on exertion remains unchanged. [TextBox_11] : 1 [TextBox_13] : 30 [YearQuit] : 2007 [de-identified] : denies hemoptysis [ESS] : 0 [TextBox_42] : 05/21 [TextBox_52] : 1 [AdvancecareDate] : 07/21

## 2021-07-15 ENCOUNTER — APPOINTMENT (OUTPATIENT)
Dept: GASTROENTEROLOGY | Facility: CLINIC | Age: 66
End: 2021-07-15

## 2021-07-15 DIAGNOSIS — C15.9 MALIGNANT NEOPLASM OF ESOPHAGUS, UNSPECIFIED: ICD-10-CM

## 2021-07-15 DIAGNOSIS — K44.9 DIAPHRAGMATIC HERNIA WITHOUT OBSTRUCTION OR GANGRENE: ICD-10-CM

## 2021-07-15 DIAGNOSIS — I25.10 ATHEROSCLEROTIC HEART DISEASE OF NATIVE CORONARY ARTERY WITHOUT ANGINA PECTORIS: ICD-10-CM

## 2021-07-15 DIAGNOSIS — N40.1 BENIGN PROSTATIC HYPERPLASIA WITH LOWER URINARY TRACT SYMPTOMS: ICD-10-CM

## 2021-07-15 DIAGNOSIS — K22.8 OTHER SPECIFIED DISEASES OF ESOPHAGUS: ICD-10-CM

## 2021-07-15 DIAGNOSIS — J43.9 EMPHYSEMA, UNSPECIFIED: ICD-10-CM

## 2021-07-15 DIAGNOSIS — C16.0 MALIGNANT NEOPLASM OF CARDIA: ICD-10-CM

## 2021-07-15 DIAGNOSIS — Z87.891 PERSONAL HISTORY OF NICOTINE DEPENDENCE: ICD-10-CM

## 2021-07-15 DIAGNOSIS — I89.9 NONINFECTIVE DISORDER OF LYMPHATIC VESSELS AND LYMPH NODES, UNSPECIFIED: ICD-10-CM

## 2021-07-15 DIAGNOSIS — N13.8 OTHER OBSTRUCTIVE AND REFLUX UROPATHY: ICD-10-CM

## 2021-07-16 ENCOUNTER — APPOINTMENT (OUTPATIENT)
Dept: DISASTER EMERGENCY | Facility: CLINIC | Age: 66
End: 2021-07-16

## 2021-07-16 ENCOUNTER — LABORATORY RESULT (OUTPATIENT)
Age: 66
End: 2021-07-16

## 2021-07-16 ENCOUNTER — APPOINTMENT (OUTPATIENT)
Dept: GASTROENTEROLOGY | Facility: CLINIC | Age: 66
End: 2021-07-16

## 2021-07-16 PROBLEM — J39.8 OTHER SPECIFIED DISEASES OF UPPER RESPIRATORY TRACT: Chronic | Status: ACTIVE | Noted: 2021-07-09

## 2021-07-16 PROBLEM — I25.10 ATHEROSCLEROTIC HEART DISEASE OF NATIVE CORONARY ARTERY WITHOUT ANGINA PECTORIS: Chronic | Status: ACTIVE | Noted: 2021-07-09

## 2021-07-16 PROBLEM — E01.0 IODINE-DEFICIENCY RELATED DIFFUSE (ENDEMIC) GOITER: Chronic | Status: ACTIVE | Noted: 2021-07-09

## 2021-07-16 PROBLEM — S42.301A UNSPECIFIED FRACTURE OF SHAFT OF HUMERUS, RIGHT ARM, INITIAL ENCOUNTER FOR CLOSED FRACTURE: Chronic | Status: ACTIVE | Noted: 2021-07-09

## 2021-07-16 PROBLEM — K22.8 OTHER SPECIFIED DISEASES OF ESOPHAGUS: Chronic | Status: ACTIVE | Noted: 2021-07-09

## 2021-07-16 PROBLEM — M51.36 OTHER INTERVERTEBRAL DISC DEGENERATION, LUMBAR REGION: Chronic | Status: ACTIVE | Noted: 2021-07-09

## 2021-07-16 PROBLEM — J43.9 EMPHYSEMA, UNSPECIFIED: Chronic | Status: ACTIVE | Noted: 2021-07-09

## 2021-07-19 ENCOUNTER — OUTPATIENT (OUTPATIENT)
Dept: OUTPATIENT SERVICES | Facility: HOSPITAL | Age: 66
LOS: 1 days | Discharge: HOME | End: 2021-07-19
Payer: MEDICARE

## 2021-07-19 ENCOUNTER — RESULT REVIEW (OUTPATIENT)
Age: 66
End: 2021-07-19

## 2021-07-19 ENCOUNTER — APPOINTMENT (OUTPATIENT)
Dept: GASTROENTEROLOGY | Facility: HOSPITAL | Age: 66
End: 2021-07-19
Payer: MEDICARE

## 2021-07-19 VITALS
HEIGHT: 70 IN | HEART RATE: 91 BPM | SYSTOLIC BLOOD PRESSURE: 130 MMHG | TEMPERATURE: 97 F | WEIGHT: 186.07 LBS | RESPIRATION RATE: 30 BRPM | DIASTOLIC BLOOD PRESSURE: 60 MMHG | OXYGEN SATURATION: 99 %

## 2021-07-19 DIAGNOSIS — Z87.19 PERSONAL HISTORY OF OTHER DISEASES OF THE DIGESTIVE SYSTEM: Chronic | ICD-10-CM

## 2021-07-19 DIAGNOSIS — I86.1 SCROTAL VARICES: Chronic | ICD-10-CM

## 2021-07-19 DIAGNOSIS — Z90.89 ACQUIRED ABSENCE OF OTHER ORGANS: Chronic | ICD-10-CM

## 2021-07-19 DIAGNOSIS — R93.3 ABNORMAL FINDINGS ON DIAGNOSTIC IMAGING OF OTHER PARTS OF DIGESTIVE TRACT: ICD-10-CM

## 2021-07-19 PROCEDURE — 88305 TISSUE EXAM BY PATHOLOGIST: CPT | Mod: 26

## 2021-07-19 PROCEDURE — 45385 COLONOSCOPY W/LESION REMOVAL: CPT

## 2021-07-19 PROCEDURE — 88305 TISSUE EXAM BY PATHOLOGIST: CPT

## 2021-07-19 PROCEDURE — C1889: CPT

## 2021-07-20 LAB — SURGICAL PATHOLOGY STUDY: SIGNIFICANT CHANGE UP

## 2021-07-22 DIAGNOSIS — R93.3 ABNORMAL FINDINGS ON DIAGNOSTIC IMAGING OF OTHER PARTS OF DIGESTIVE TRACT: ICD-10-CM

## 2021-07-22 DIAGNOSIS — M51.36 OTHER INTERVERTEBRAL DISC DEGENERATION, LUMBAR REGION: ICD-10-CM

## 2021-07-22 DIAGNOSIS — N13.8 OTHER OBSTRUCTIVE AND REFLUX UROPATHY: ICD-10-CM

## 2021-07-22 DIAGNOSIS — D12.5 BENIGN NEOPLASM OF SIGMOID COLON: ICD-10-CM

## 2021-07-22 DIAGNOSIS — J39.8 OTHER SPECIFIED DISEASES OF UPPER RESPIRATORY TRACT: ICD-10-CM

## 2021-07-22 DIAGNOSIS — I25.84 CORONARY ATHEROSCLEROSIS DUE TO CALCIFIED CORONARY LESION: ICD-10-CM

## 2021-07-22 DIAGNOSIS — Z87.891 PERSONAL HISTORY OF NICOTINE DEPENDENCE: ICD-10-CM

## 2021-07-22 DIAGNOSIS — Z85.01 PERSONAL HISTORY OF MALIGNANT NEOPLASM OF ESOPHAGUS: ICD-10-CM

## 2021-07-22 DIAGNOSIS — R39.15 URGENCY OF URINATION: ICD-10-CM

## 2021-07-22 DIAGNOSIS — I25.10 ATHEROSCLEROTIC HEART DISEASE OF NATIVE CORONARY ARTERY WITHOUT ANGINA PECTORIS: ICD-10-CM

## 2021-07-22 DIAGNOSIS — R91.8 OTHER NONSPECIFIC ABNORMAL FINDING OF LUNG FIELD: ICD-10-CM

## 2021-07-22 DIAGNOSIS — K57.30 DIVERTICULOSIS OF LARGE INTESTINE WITHOUT PERFORATION OR ABSCESS WITHOUT BLEEDING: ICD-10-CM

## 2021-07-22 DIAGNOSIS — N40.1 BENIGN PROSTATIC HYPERPLASIA WITH LOWER URINARY TRACT SYMPTOMS: ICD-10-CM

## 2021-07-22 DIAGNOSIS — E01.0 IODINE-DEFICIENCY RELATED DIFFUSE (ENDEMIC) GOITER: ICD-10-CM

## 2021-07-22 DIAGNOSIS — M54.30 SCIATICA, UNSPECIFIED SIDE: ICD-10-CM

## 2021-07-22 DIAGNOSIS — D12.3 BENIGN NEOPLASM OF TRANSVERSE COLON: ICD-10-CM

## 2021-08-24 ENCOUNTER — APPOINTMENT (OUTPATIENT)
Dept: CT IMAGING | Facility: CLINIC | Age: 66
End: 2021-08-24

## 2021-11-05 ENCOUNTER — NON-APPOINTMENT (OUTPATIENT)
Age: 66
End: 2021-11-05

## 2022-12-05 ENCOUNTER — APPOINTMENT (OUTPATIENT)
Dept: ORTHOPEDIC SURGERY | Facility: CLINIC | Age: 67
End: 2022-12-05

## 2022-12-05 VITALS — HEIGHT: 69 IN | WEIGHT: 178 LBS | BODY MASS INDEX: 26.36 KG/M2

## 2022-12-07 NOTE — HISTORY OF PRESENT ILLNESS
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

## 2022-12-09 ENCOUNTER — APPOINTMENT (OUTPATIENT)
Dept: UROLOGY | Facility: CLINIC | Age: 67
End: 2022-12-09

## 2023-01-18 ENCOUNTER — APPOINTMENT (OUTPATIENT)
Dept: GASTROENTEROLOGY | Facility: CLINIC | Age: 68
End: 2023-01-18
Payer: MEDICARE

## 2023-01-18 VITALS
DIASTOLIC BLOOD PRESSURE: 77 MMHG | HEART RATE: 82 BPM | BODY MASS INDEX: 26.43 KG/M2 | HEIGHT: 69 IN | SYSTOLIC BLOOD PRESSURE: 119 MMHG | WEIGHT: 178.44 LBS

## 2023-01-18 DIAGNOSIS — Z12.11 ENCOUNTER FOR SCREENING FOR MALIGNANT NEOPLASM OF COLON: ICD-10-CM

## 2023-01-18 PROCEDURE — 99214 OFFICE O/P EST MOD 30 MIN: CPT

## 2023-01-23 NOTE — HISTORY OF PRESENT ILLNESS
[FreeTextEntry1] : Edgar Lerner is a 67 year old male diagnosed with T3N1 esohpageal cancer s/p chemo and surgery, presenting today for follow up visit. \par \par Pt has been undergoing treatment for esophageal cancer at [facility]. Has been feeling otherwise well. Presents today because had colonoscopy in July 2021 revealing 20MM colon polyp identified as an adenoma with high grade dysplasia, was recommended by his oncologist to have repeat colonoscopy. Pt denies any GI complaints, denies abdominal pain, nausea, vomiting, dysphagia.No weight loss. No hematemesis. Good appetite. Has been having regular bowel movements daily without significant straining or overt bleeding such as melena or hematochezia.

## 2023-01-23 NOTE — ASSESSMENT
[FreeTextEntry1] : Plan:\par Since pt had polyp with high grade dysplasia, will perform colonoscopy. Risks versus benefits as well as instructions reviewed, pt agrees to planned procedure. Did not do well after previous procedure with miralax prep. requesting different prep. Provided sample for clenpiq, given instructions. All questions answered, pt expressed understanding. Seen and discussed with Dr. Cross.

## 2023-01-23 NOTE — PHYSICAL EXAM
[Alert] : alert [Healthy Appearing] : healthy appearing [Sclera] : the sclera and conjunctiva were normal [Bowel Sounds] : normal bowel sounds [Abdomen Tenderness] : non-tender [Abdomen Soft] : soft [Normal Color / Pigmentation] : normal skin color and pigmentation [Oriented To Time, Place, And Person] : oriented to person, place, and time [Normal Appearance] : the appearance of the neck was normal [No Neck Mass] : no neck mass was observed [Abnormal Walk] : normal gait [No Clubbing, Cyanosis] : no clubbing or cyanosis of the fingernails [] : no rash [Skin Lesions] : no skin lesions [No Focal Deficits] : no focal deficits [Motor Exam] : the motor exam was normal

## 2023-01-23 NOTE — ADDENDUM
[FreeTextEntry1] : I was present with the NP during the history and exam. I discussed the case and examined the patient with the NP and agree with the findings and plan as documented in the NP's note. 67 year old man with esophageal cancer s/p chemo/surgery, with large colon polyp and high grade dysplasia. Will book for colonoscopy. Patient's first degree relatives/kids aware of risks, daughter has gone for colonoscopy.

## 2023-02-07 ENCOUNTER — LABORATORY RESULT (OUTPATIENT)
Age: 68
End: 2023-02-07

## 2023-02-10 ENCOUNTER — RESULT REVIEW (OUTPATIENT)
Age: 68
End: 2023-02-10

## 2023-02-10 ENCOUNTER — APPOINTMENT (OUTPATIENT)
Dept: GASTROENTEROLOGY | Facility: HOSPITAL | Age: 68
End: 2023-02-10
Payer: MEDICARE

## 2023-02-10 ENCOUNTER — OUTPATIENT (OUTPATIENT)
Dept: OUTPATIENT SERVICES | Facility: HOSPITAL | Age: 68
LOS: 1 days | Discharge: HOME | End: 2023-02-10
Payer: MEDICARE

## 2023-02-10 VITALS
HEART RATE: 63 BPM | TEMPERATURE: 97 F | RESPIRATION RATE: 15 BRPM | DIASTOLIC BLOOD PRESSURE: 74 MMHG | SYSTOLIC BLOOD PRESSURE: 116 MMHG | HEIGHT: 69 IN | OXYGEN SATURATION: 100 % | WEIGHT: 175.05 LBS

## 2023-02-10 DIAGNOSIS — Z12.11 ENCOUNTER FOR SCREENING FOR MALIGNANT NEOPLASM OF COLON: ICD-10-CM

## 2023-02-10 DIAGNOSIS — I86.1 SCROTAL VARICES: Chronic | ICD-10-CM

## 2023-02-10 DIAGNOSIS — Z87.19 PERSONAL HISTORY OF OTHER DISEASES OF THE DIGESTIVE SYSTEM: Chronic | ICD-10-CM

## 2023-02-10 DIAGNOSIS — Z90.89 ACQUIRED ABSENCE OF OTHER ORGANS: Chronic | ICD-10-CM

## 2023-02-10 PROCEDURE — 45380 COLONOSCOPY AND BIOPSY: CPT | Mod: GC

## 2023-02-10 PROCEDURE — 88305 TISSUE EXAM BY PATHOLOGIST: CPT

## 2023-02-10 PROCEDURE — 88305 TISSUE EXAM BY PATHOLOGIST: CPT | Mod: 26

## 2023-02-10 RX ORDER — CYCLOBENZAPRINE HYDROCHLORIDE 10 MG/1
0 TABLET, FILM COATED ORAL
Qty: 0 | Refills: 0 | DISCHARGE

## 2023-02-10 NOTE — ASU PATIENT PROFILE, ADULT - NSICDXPASTSURGICALHX_GEN_ALL_CORE_FT
PAST SURGICAL HISTORY:  H/O inguinal hernia 2016    S/P tonsillectomy 3y/o    Varicocele right 1981

## 2023-02-10 NOTE — ASU PATIENT PROFILE, ADULT - NSICDXPASTMEDICALHX_GEN_ALL_CORE_FT
PAST MEDICAL HISTORY:  Benign prostatic hyperplasia     Coronary artery calcification     DDD (degenerative disc disease), lumbar     Emphysema lung     Esophageal dilatation     Inguinal hernia     Right arm fracture     Sciatica     Testosterone deficiency     Thyromegaly     Tracheal deviation     Tracheal stenosis

## 2023-02-13 LAB — SURGICAL PATHOLOGY STUDY: SIGNIFICANT CHANGE UP

## 2023-02-14 ENCOUNTER — NON-APPOINTMENT (OUTPATIENT)
Age: 68
End: 2023-02-14

## 2023-02-16 DIAGNOSIS — K57.30 DIVERTICULOSIS OF LARGE INTESTINE WITHOUT PERFORATION OR ABSCESS WITHOUT BLEEDING: ICD-10-CM

## 2023-02-16 DIAGNOSIS — Z85.01 PERSONAL HISTORY OF MALIGNANT NEOPLASM OF ESOPHAGUS: ICD-10-CM

## 2023-02-16 DIAGNOSIS — K52.9 NONINFECTIVE GASTROENTERITIS AND COLITIS, UNSPECIFIED: ICD-10-CM

## 2023-02-16 DIAGNOSIS — Z09 ENCOUNTER FOR FOLLOW-UP EXAMINATION AFTER COMPLETED TREATMENT FOR CONDITIONS OTHER THAN MALIGNANT NEOPLASM: ICD-10-CM

## 2023-02-16 DIAGNOSIS — Z86.010 PERSONAL HISTORY OF COLONIC POLYPS: ICD-10-CM

## 2023-02-16 DIAGNOSIS — J44.9 CHRONIC OBSTRUCTIVE PULMONARY DISEASE, UNSPECIFIED: ICD-10-CM

## 2023-02-16 DIAGNOSIS — K64.8 OTHER HEMORRHOIDS: ICD-10-CM

## 2023-02-16 DIAGNOSIS — I25.10 ATHEROSCLEROTIC HEART DISEASE OF NATIVE CORONARY ARTERY WITHOUT ANGINA PECTORIS: ICD-10-CM

## 2023-05-09 ENCOUNTER — APPOINTMENT (OUTPATIENT)
Dept: UROLOGY | Facility: CLINIC | Age: 68
End: 2023-05-09
Payer: MEDICARE

## 2023-05-09 VITALS
OXYGEN SATURATION: 99 % | BODY MASS INDEX: 27.55 KG/M2 | WEIGHT: 186 LBS | HEART RATE: 64 BPM | DIASTOLIC BLOOD PRESSURE: 67 MMHG | TEMPERATURE: 98 F | SYSTOLIC BLOOD PRESSURE: 124 MMHG | HEIGHT: 69 IN

## 2023-05-09 DIAGNOSIS — M19.90 UNSPECIFIED OSTEOARTHRITIS, UNSPECIFIED SITE: ICD-10-CM

## 2023-05-09 DIAGNOSIS — R39.15 URGENCY OF URINATION: ICD-10-CM

## 2023-05-09 DIAGNOSIS — R39.11 HESITANCY OF MICTURITION: ICD-10-CM

## 2023-05-09 DIAGNOSIS — Z12.5 ENCOUNTER FOR SCREENING FOR MALIGNANT NEOPLASM OF PROSTATE: ICD-10-CM

## 2023-05-09 PROCEDURE — 99204 OFFICE O/P NEW MOD 45 MIN: CPT

## 2023-05-09 RX ORDER — ALPRAZOLAM 2 MG/1
2 TABLET ORAL
Qty: 90 | Refills: 0 | Status: COMPLETED | COMMUNITY
Start: 2021-06-22 | End: 2023-05-09

## 2023-05-09 RX ORDER — CYCLOBENZAPRINE HYDROCHLORIDE 5 MG/1
5 TABLET, FILM COATED ORAL
Qty: 90 | Refills: 0 | Status: COMPLETED | COMMUNITY
Start: 2021-06-22 | End: 2023-05-09

## 2023-05-09 RX ORDER — OXYCODONE HYDROCHLORIDE 30 MG/1
30 TABLET ORAL
Qty: 120 | Refills: 0 | Status: COMPLETED | COMMUNITY
Start: 2021-06-22 | End: 2023-05-09

## 2023-05-09 RX ORDER — ERYTHROMYCIN 5 MG/G
5 OINTMENT OPHTHALMIC
Qty: 4 | Refills: 0 | Status: COMPLETED | COMMUNITY
Start: 2021-06-10 | End: 2023-05-09

## 2023-05-09 RX ORDER — OMEPRAZOLE 40 MG/1
40 CAPSULE, DELAYED RELEASE ORAL
Qty: 90 | Refills: 0 | Status: COMPLETED | COMMUNITY
Start: 2021-06-28 | End: 2023-05-09

## 2023-05-11 PROBLEM — Z12.5 PROSTATE CANCER SCREENING: Status: ACTIVE | Noted: 2023-05-11

## 2023-05-11 LAB
PSA FREE FLD-MCNC: 20 %
PSA FREE SERPL-MCNC: 0.6 NG/ML
PSA SERPL-MCNC: 2.94 NG/ML

## 2023-05-11 NOTE — PHYSICAL EXAM
[General Appearance - Well Developed] : well developed [General Appearance - Well Nourished] : well nourished [Normal Appearance] : normal appearance [Well Groomed] : well groomed [General Appearance - In No Acute Distress] : no acute distress [Edema] : no peripheral edema [Respiration, Rhythm And Depth] : normal respiratory rhythm and effort [Exaggerated Use Of Accessory Muscles For Inspiration] : no accessory muscle use [Abdomen Soft] : soft [Abdomen Tenderness] : non-tender [Costovertebral Angle Tenderness] : no ~M costovertebral angle tenderness [Urethral Meatus] : meatus normal [Urinary Bladder Findings] : the bladder was normal on palpation [Testes Mass (___cm)] : there were no testicular masses [Scrotum] : the scrotum was normal [No Prostate Nodules] : no prostate nodules [Normal Station and Gait] : the gait and station were normal for the patient's age [] : no rash [No Focal Deficits] : no focal deficits [Oriented To Time, Place, And Person] : oriented to person, place, and time [Mood] : the mood was normal [Affect] : the affect was normal [Not Anxious] : not anxious [No Palpable Adenopathy] : no palpable adenopathy

## 2023-05-11 NOTE — LETTER BODY
[Dear  ___] : Dear  [unfilled], [Courtesy Letter:] : I had the pleasure of seeing your patient, [unfilled], in my office today. [Please see my note below.] : Please see my note below. [FreeTextEntry2] : Víctor Jasmine MD\par [address]\[place] [address] [FreeTextEntry3] : Sincerely,\par \par \par \par Rich Olmos MD, FACS\par Chief of Urology, [facility]\par  of Urology\par \par [facility]\par [facility]\par [address] [place] [address]\[place] [address]\par P: [phone]\par F: [phone]\par [facility]\par

## 2023-05-11 NOTE — HISTORY OF PRESENT ILLNESS
[FreeTextEntry1] : DIANA MARY  is a 68 year old man with a history of esophageal cancer here for a check up. He has not been in the office since 2016. He reports he has had his PSA drawn with his PCP and denies ever being told it's elevated. He had a CT scan in January which showed an enlarged prostate, which he has been told before.\par \par He reports nocturia x1 and occasional urinary urgency if he holds his urine for a long period of time. He is on Tadalafil 5mg daily, was on Tamsulosin but had cataract surgery and was told to stop. He denies weak straining, frequency, urgency, incontinence/dribbling, dysuria, or hematuria. \par \par He took a Valium with Tadalafil he feels he is unable to urination but it resolved quickly on its own. \par \par Denies family hx of urological disorders.\par \par Significant interval history since his last visit here includes a diagnosis of esophageal cancer.  He underwent an Pepe David esophagectomy as well as chemotherapy.  He reports being told that he is free of disease.\par \par

## 2023-05-11 NOTE — ASSESSMENT
[FreeTextEntry1] : I rechecked the PSA level today which is 2.94 ng/mL with 20% free fraction.  This is fairly comparable to PSA levels from years past outside of 2016 when his level was 7.67, after which the level quickly normalized.  I do not think he needs to consider additional work-up at this point with either prostate MRI or biopsy.\par \par His urinary symptoms are under fairly good control at this time.  He had been on tamsulosin which was working well for him but because of the cataract surgery, that was discontinued and he is now using tadalafil 5 mg daily.  He seems to be getting good treatment effect from this and has been told by his ophthalmologist that he should no longer use tamsulosin or any other alpha-blocker.  We will continue him on the tadalafil unless he has any worsening urinary symptoms to suggest progressive bladder outlet obstruction.\par

## 2023-05-23 NOTE — ASU PREOP CHECKLIST - ANTIBIOTIC
Post-Discharge Transitional Care Follow Up      Benita Marion   YOB: 1945    Date of Office Visit:  5/23/2023  Date of Hospital Admission: 5/16/23  Date of Hospital Discharge: 5/18/23  Readmission Risk Score (high >=14%. Medium >=10%):Readmission Risk Score: 11.3      Care management risk score Rising risk (score 2-5) and Complex Care (Scores >=6): No Risk Score On File     Non face to face  following discharge, date last encounter closed (first attempt may have been earlier): 05/22/2023     Call initiated 2 business days of discharge: Yes     TIA (transient ischemic attack)  Assessment & Plan:   Resolved   Continue current medications    Syncope, unspecified syncope type  Assessment & Plan:   Resolved  Moderate Alzheimer's dementia with agitation, unspecified timing of dementia onset ([facility] [address]  Assessment & Plan:   Stable   Continue Aricept and Risperidone  Uncontrolled type 2 diabetes mellitus with hyperglycemia ([facility] [address]  Assessment & Plan:   Uncontrolled, continue current medications   Continue lantus 25 units nightly and humalog 5 units with meals. Continue CGM  Repeat A1c in 4 months  Essential hypertension  Assessment & Plan:   Well-controlled, continue current medications  Other depression  Assessment & Plan:   [address] discharge follow-up  -     DC DISCHARGE MEDS RECONCILED W/ CURRENT OUTPATIENT MED LIST  Records reviewed through [facility]    Medical Decision Making: straightforward  Return in about 4 months (around 9/23/2023) for hypertension follow up. Subjective:   HPI    Inpatient course: Discharge summary reviewed- see chart. Interval history/Current status:   D/t patient's dementia she is unable to provider HPI. Family friend with pt today to provider history. She was found on floor, unknown how long she was on floor or if she was conscious. Per friend seems to be back to baseline. She is now living at [facility] on memory floor.  Mood and behavior n/a

## 2023-07-29 RX ORDER — CIPROFLOXACIN LACTATE 400MG/40ML
1 VIAL (ML) INTRAVENOUS
Refills: 0 | DISCHARGE
Start: 2023-07-29 | End: 2023-08-11

## 2023-08-03 ENCOUNTER — OUTPATIENT (OUTPATIENT)
Dept: OUTPATIENT SERVICES | Facility: HOSPITAL | Age: 68
LOS: 1 days | End: 2023-08-03
Payer: MEDICARE

## 2023-08-03 ENCOUNTER — APPOINTMENT (OUTPATIENT)
Dept: CT IMAGING | Facility: CLINIC | Age: 68
End: 2023-08-03
Payer: MEDICARE

## 2023-08-03 DIAGNOSIS — Z90.89 ACQUIRED ABSENCE OF OTHER ORGANS: Chronic | ICD-10-CM

## 2023-08-03 DIAGNOSIS — R31.0 GROSS HEMATURIA: ICD-10-CM

## 2023-08-03 DIAGNOSIS — Z87.19 PERSONAL HISTORY OF OTHER DISEASES OF THE DIGESTIVE SYSTEM: Chronic | ICD-10-CM

## 2023-08-03 DIAGNOSIS — I86.1 SCROTAL VARICES: Chronic | ICD-10-CM

## 2023-08-03 PROCEDURE — 74178 CT ABD&PLV WO CNTR FLWD CNTR: CPT

## 2023-08-03 PROCEDURE — 74178 CT ABD&PLV WO CNTR FLWD CNTR: CPT | Mod: 26,MH

## 2023-08-13 ENCOUNTER — INPATIENT (INPATIENT)
Facility: HOSPITAL | Age: 68
LOS: 0 days | Discharge: HOME | DRG: 305 | End: 2023-08-14
Attending: STUDENT IN AN ORGANIZED HEALTH CARE EDUCATION/TRAINING PROGRAM | Admitting: FAMILY MEDICINE
Payer: MEDICARE

## 2023-08-13 VITALS — HEIGHT: 69 IN | WEIGHT: 160.06 LBS

## 2023-08-13 DIAGNOSIS — E05.20 THYROTOXICOSIS WITH TOXIC MULTINODULAR GOITER WITHOUT THYROTOXIC CRISIS OR STORM: ICD-10-CM

## 2023-08-13 DIAGNOSIS — Z87.19 PERSONAL HISTORY OF OTHER DISEASES OF THE DIGESTIVE SYSTEM: Chronic | ICD-10-CM

## 2023-08-13 DIAGNOSIS — M51.36 OTHER INTERVERTEBRAL DISC DEGENERATION, LUMBAR REGION: ICD-10-CM

## 2023-08-13 DIAGNOSIS — R07.9 CHEST PAIN, UNSPECIFIED: ICD-10-CM

## 2023-08-13 DIAGNOSIS — I86.1 SCROTAL VARICES: Chronic | ICD-10-CM

## 2023-08-13 DIAGNOSIS — Z90.89 ACQUIRED ABSENCE OF OTHER ORGANS: Chronic | ICD-10-CM

## 2023-08-13 DIAGNOSIS — N18.9 CHRONIC KIDNEY DISEASE, UNSPECIFIED: ICD-10-CM

## 2023-08-13 DIAGNOSIS — E29.1 TESTICULAR HYPOFUNCTION: ICD-10-CM

## 2023-08-13 DIAGNOSIS — Z87.891 PERSONAL HISTORY OF NICOTINE DEPENDENCE: ICD-10-CM

## 2023-08-13 LAB
ADD ON TEST-SPECIMEN IN LAB: SIGNIFICANT CHANGE UP
ALBUMIN SERPL ELPH-MCNC: 4 G/DL — SIGNIFICANT CHANGE UP (ref 3.3–5)
ALP SERPL-CCNC: 117 U/L — SIGNIFICANT CHANGE UP (ref 40–120)
ALT FLD-CCNC: 36 U/L — SIGNIFICANT CHANGE UP (ref 12–78)
ANION GAP SERPL CALC-SCNC: 9 MMOL/L — SIGNIFICANT CHANGE UP (ref 5–17)
APPEARANCE UR: CLEAR — SIGNIFICANT CHANGE UP
APTT BLD: 28.2 SEC — SIGNIFICANT CHANGE UP (ref 24.5–35.6)
AST SERPL-CCNC: 22 U/L — SIGNIFICANT CHANGE UP (ref 15–37)
BASOPHILS # BLD AUTO: 0.07 K/UL — SIGNIFICANT CHANGE UP (ref 0–0.2)
BASOPHILS NFR BLD AUTO: 0.5 % — SIGNIFICANT CHANGE UP (ref 0–2)
BILIRUB SERPL-MCNC: 1 MG/DL — SIGNIFICANT CHANGE UP (ref 0.2–1.2)
BILIRUB UR-MCNC: NEGATIVE — SIGNIFICANT CHANGE UP
BLD GP AB SCN SERPL QL: SIGNIFICANT CHANGE UP
BUN SERPL-MCNC: 25 MG/DL — HIGH (ref 7–23)
CALCIUM SERPL-MCNC: 9.5 MG/DL — SIGNIFICANT CHANGE UP (ref 8.5–10.1)
CHLORIDE SERPL-SCNC: 106 MMOL/L — SIGNIFICANT CHANGE UP (ref 96–108)
CO2 SERPL-SCNC: 23 MMOL/L — SIGNIFICANT CHANGE UP (ref 22–31)
COLOR SPEC: YELLOW — SIGNIFICANT CHANGE UP
CREAT SERPL-MCNC: 1.37 MG/DL — HIGH (ref 0.5–1.3)
DIFF PNL FLD: ABNORMAL
EGFR: 55 ML/MIN/1.73M2 — LOW
EOSINOPHIL # BLD AUTO: 0.05 K/UL — SIGNIFICANT CHANGE UP (ref 0–0.5)
EOSINOPHIL NFR BLD AUTO: 0.3 % — SIGNIFICANT CHANGE UP (ref 0–6)
GLUCOSE SERPL-MCNC: 116 MG/DL — HIGH (ref 70–99)
GLUCOSE UR QL: NEGATIVE — SIGNIFICANT CHANGE UP
HCT VFR BLD CALC: 48.2 % — SIGNIFICANT CHANGE UP (ref 39–50)
HGB BLD-MCNC: 17 G/DL — SIGNIFICANT CHANGE UP (ref 13–17)
IMM GRANULOCYTES NFR BLD AUTO: 0.8 % — SIGNIFICANT CHANGE UP (ref 0–0.9)
INR BLD: 0.91 RATIO — SIGNIFICANT CHANGE UP (ref 0.85–1.18)
KETONES UR-MCNC: NEGATIVE — SIGNIFICANT CHANGE UP
LEUKOCYTE ESTERASE UR-ACNC: NEGATIVE — SIGNIFICANT CHANGE UP
LIDOCAIN IGE QN: 118 U/L — SIGNIFICANT CHANGE UP (ref 73–393)
LYMPHOCYTES # BLD AUTO: 1.67 K/UL — SIGNIFICANT CHANGE UP (ref 1–3.3)
LYMPHOCYTES # BLD AUTO: 10.8 % — LOW (ref 13–44)
MAGNESIUM SERPL-MCNC: 1.9 MG/DL — SIGNIFICANT CHANGE UP (ref 1.6–2.6)
MCHC RBC-ENTMCNC: 30.3 PG — SIGNIFICANT CHANGE UP (ref 27–34)
MCHC RBC-ENTMCNC: 35.3 GM/DL — SIGNIFICANT CHANGE UP (ref 32–36)
MCV RBC AUTO: 85.9 FL — SIGNIFICANT CHANGE UP (ref 80–100)
MONOCYTES # BLD AUTO: 0.91 K/UL — HIGH (ref 0–0.9)
MONOCYTES NFR BLD AUTO: 5.9 % — SIGNIFICANT CHANGE UP (ref 2–14)
NEUTROPHILS # BLD AUTO: 12.68 K/UL — HIGH (ref 1.8–7.4)
NEUTROPHILS NFR BLD AUTO: 81.7 % — HIGH (ref 43–77)
NITRITE UR-MCNC: NEGATIVE — SIGNIFICANT CHANGE UP
PH UR: 6 — SIGNIFICANT CHANGE UP (ref 5–8)
PLATELET # BLD AUTO: 295 K/UL — SIGNIFICANT CHANGE UP (ref 150–400)
POTASSIUM SERPL-MCNC: 3.8 MMOL/L — SIGNIFICANT CHANGE UP (ref 3.5–5.3)
POTASSIUM SERPL-SCNC: 3.8 MMOL/L — SIGNIFICANT CHANGE UP (ref 3.5–5.3)
PROT SERPL-MCNC: 7.1 GM/DL — SIGNIFICANT CHANGE UP (ref 6–8.3)
PROT UR-MCNC: 30 MG/DL
PROTHROM AB SERPL-ACNC: 10.3 SEC — SIGNIFICANT CHANGE UP (ref 9.5–13)
RBC # BLD: 5.61 M/UL — SIGNIFICANT CHANGE UP (ref 4.2–5.8)
RBC # FLD: 13.2 % — SIGNIFICANT CHANGE UP (ref 10.3–14.5)
RBC CASTS # UR COMP ASSIST: SIGNIFICANT CHANGE UP /HPF (ref 0–4)
SODIUM SERPL-SCNC: 138 MMOL/L — SIGNIFICANT CHANGE UP (ref 135–145)
SP GR SPEC: 1.01 — SIGNIFICANT CHANGE UP (ref 1.01–1.02)
T4 FREE SERPL-MCNC: 2.04 NG/DL — HIGH (ref 0.76–1.46)
TROPONIN I, HIGH SENSITIVITY RESULT: 15.24 NG/L — SIGNIFICANT CHANGE UP
TROPONIN I, HIGH SENSITIVITY RESULT: 20.62 NG/L — SIGNIFICANT CHANGE UP
TROPONIN I, HIGH SENSITIVITY RESULT: 27.17 NG/L — SIGNIFICANT CHANGE UP
TSH SERPL-MCNC: 0.04 UU/ML — LOW (ref 0.34–4.82)
URATE CRY FLD QL MICRO: ABNORMAL
UROBILINOGEN FLD QL: NEGATIVE — SIGNIFICANT CHANGE UP
WBC # BLD: 15.51 K/UL — HIGH (ref 3.8–10.5)
WBC # FLD AUTO: 15.51 K/UL — HIGH (ref 3.8–10.5)
WBC UR QL: SIGNIFICANT CHANGE UP /HPF (ref 0–5)

## 2023-08-13 PROCEDURE — 36415 COLL VENOUS BLD VENIPUNCTURE: CPT

## 2023-08-13 PROCEDURE — 84484 ASSAY OF TROPONIN QUANT: CPT

## 2023-08-13 PROCEDURE — 71275 CT ANGIOGRAPHY CHEST: CPT | Mod: 26,MA

## 2023-08-13 PROCEDURE — 86803 HEPATITIS C AB TEST: CPT

## 2023-08-13 PROCEDURE — 93306 TTE W/DOPPLER COMPLETE: CPT

## 2023-08-13 PROCEDURE — 93010 ELECTROCARDIOGRAM REPORT: CPT

## 2023-08-13 PROCEDURE — 99291 CRITICAL CARE FIRST HOUR: CPT

## 2023-08-13 PROCEDURE — C9113: CPT

## 2023-08-13 PROCEDURE — 74174 CTA ABD&PLVS W/CONTRAST: CPT | Mod: 26,MA

## 2023-08-13 PROCEDURE — 99222 1ST HOSP IP/OBS MODERATE 55: CPT

## 2023-08-13 PROCEDURE — 80048 BASIC METABOLIC PNL TOTAL CA: CPT

## 2023-08-13 PROCEDURE — 85027 COMPLETE CBC AUTOMATED: CPT

## 2023-08-13 PROCEDURE — 71045 X-RAY EXAM CHEST 1 VIEW: CPT | Mod: 26

## 2023-08-13 RX ORDER — TADALAFIL 10 MG/1
0 TABLET, FILM COATED ORAL
Qty: 0 | Refills: 0 | DISCHARGE

## 2023-08-13 RX ORDER — ACETAMINOPHEN 500 MG
650 TABLET ORAL EVERY 6 HOURS
Refills: 0 | Status: DISCONTINUED | OUTPATIENT
Start: 2023-08-13 | End: 2023-08-14

## 2023-08-13 RX ORDER — METOCLOPRAMIDE HCL 10 MG
10 TABLET ORAL
Refills: 0 | Status: DISCONTINUED | OUTPATIENT
Start: 2023-08-13 | End: 2023-08-14

## 2023-08-13 RX ORDER — SUCRALFATE 1 G
1 TABLET ORAL
Refills: 0 | Status: DISCONTINUED | OUTPATIENT
Start: 2023-08-13 | End: 2023-08-14

## 2023-08-13 RX ORDER — NITROGLYCERIN 6.5 MG
0.5 CAPSULE, EXTENDED RELEASE ORAL ONCE
Refills: 0 | Status: COMPLETED | OUTPATIENT
Start: 2023-08-13 | End: 2023-08-13

## 2023-08-13 RX ORDER — AMLODIPINE BESYLATE 2.5 MG/1
5 TABLET ORAL DAILY
Refills: 0 | Status: DISCONTINUED | OUTPATIENT
Start: 2023-08-13 | End: 2023-08-14

## 2023-08-13 RX ORDER — HYDRALAZINE HCL 50 MG
25 TABLET ORAL THREE TIMES A DAY
Refills: 0 | Status: DISCONTINUED | OUTPATIENT
Start: 2023-08-13 | End: 2023-08-14

## 2023-08-13 RX ORDER — LABETALOL HCL 100 MG
10 TABLET ORAL ONCE
Refills: 0 | Status: COMPLETED | OUTPATIENT
Start: 2023-08-13 | End: 2023-08-13

## 2023-08-13 RX ORDER — SODIUM CHLORIDE 9 MG/ML
500 INJECTION INTRAMUSCULAR; INTRAVENOUS; SUBCUTANEOUS ONCE
Refills: 0 | Status: COMPLETED | OUTPATIENT
Start: 2023-08-13 | End: 2023-08-13

## 2023-08-13 RX ORDER — ASPIRIN/CALCIUM CARB/MAGNESIUM 324 MG
324 TABLET ORAL ONCE
Refills: 0 | Status: COMPLETED | OUTPATIENT
Start: 2023-08-13 | End: 2023-08-13

## 2023-08-13 RX ORDER — ONDANSETRON 8 MG/1
4 TABLET, FILM COATED ORAL ONCE
Refills: 0 | Status: COMPLETED | OUTPATIENT
Start: 2023-08-13 | End: 2023-08-13

## 2023-08-13 RX ORDER — FAMOTIDINE 10 MG/ML
20 INJECTION INTRAVENOUS DAILY
Refills: 0 | Status: DISCONTINUED | OUTPATIENT
Start: 2023-08-13 | End: 2023-08-14

## 2023-08-13 RX ORDER — FENTANYL CITRATE 50 UG/ML
50 INJECTION INTRAVENOUS ONCE
Refills: 0 | Status: DISCONTINUED | OUTPATIENT
Start: 2023-08-13 | End: 2023-08-13

## 2023-08-13 RX ORDER — PANTOPRAZOLE SODIUM 20 MG/1
40 TABLET, DELAYED RELEASE ORAL DAILY
Refills: 0 | Status: DISCONTINUED | OUTPATIENT
Start: 2023-08-13 | End: 2023-08-14

## 2023-08-13 RX ORDER — FAMOTIDINE 10 MG/ML
20 INJECTION INTRAVENOUS ONCE
Refills: 0 | Status: COMPLETED | OUTPATIENT
Start: 2023-08-13 | End: 2023-08-13

## 2023-08-13 RX ADMIN — FENTANYL CITRATE 50 MICROGRAM(S): 50 INJECTION INTRAVENOUS at 11:11

## 2023-08-13 RX ADMIN — ONDANSETRON 4 MILLIGRAM(S): 8 TABLET, FILM COATED ORAL at 16:09

## 2023-08-13 RX ADMIN — Medication 0.5 INCH(S): at 13:40

## 2023-08-13 RX ADMIN — PANTOPRAZOLE SODIUM 40 MILLIGRAM(S): 20 TABLET, DELAYED RELEASE ORAL at 13:42

## 2023-08-13 RX ADMIN — Medication 25 MILLIGRAM(S): at 17:49

## 2023-08-13 RX ADMIN — Medication 324 MILLIGRAM(S): at 11:53

## 2023-08-13 RX ADMIN — FENTANYL CITRATE 50 MICROGRAM(S): 50 INJECTION INTRAVENOUS at 12:00

## 2023-08-13 RX ADMIN — SODIUM CHLORIDE 500 MILLILITER(S): 9 INJECTION INTRAMUSCULAR; INTRAVENOUS; SUBCUTANEOUS at 11:10

## 2023-08-13 RX ADMIN — Medication 10 MILLIGRAM(S): at 12:54

## 2023-08-13 RX ADMIN — Medication 1.25 MILLIGRAM(S): at 17:03

## 2023-08-13 RX ADMIN — FAMOTIDINE 20 MILLIGRAM(S): 10 INJECTION INTRAVENOUS at 11:52

## 2023-08-13 RX ADMIN — Medication 1.25 MILLIGRAM(S): at 20:34

## 2023-08-13 RX ADMIN — AMLODIPINE BESYLATE 5 MILLIGRAM(S): 2.5 TABLET ORAL at 16:09

## 2023-08-13 RX ADMIN — Medication 1.25 MILLIGRAM(S): at 14:20

## 2023-08-13 NOTE — H&P ADULT - ASSESSMENT
Chest pain r/o acs with elevated SBP   -admit to tele   -cardio consult   -check ecg/echo   -cont aspirin for now   -nitro prn   -start ppi   -start vasotec iv q6h    Esophageal CA in remission (treated with chemotherapy last in 1/2023), s/p Esophagectomy with gastric pull-through  -f/u as per outpatient   -start ppi     Thyroid goiter, Thyromegaly with abnormal outpt thyroid levels   -f/u outpatient     DVT prophylaxis   -ipc bilaterally

## 2023-08-13 NOTE — H&P ADULT - NSHPPHYSICALEXAM_GEN_ALL_CORE
PHYSICAL EXAM:    General: NAD, Alert & Oriented X3.  HEENT: NC/AT  Eyes: EOMI, PERRLA, Conjunctiva and sclera clear  Neck: Soft and supple. No JVD  Respiratory: Clear breath sounds bilaterally.  No wheezing, rales or rhonchi  Cardiovascular: S1 and S2, regular rate and rhythm.  No murmurs, gallops or rubs  Gastrointestinal: Soft, non-tender, non-distended. No guarding, rebound tenderness, +BS  Extremities: +2 peripheral pulses bilaterally.  No clubbing, cyanosis, or edema.    Neurological: No focal deficits  Musculoskeletal: 5/5 strength bilateral upper and lower extremities  Skin: No rashes

## 2023-08-13 NOTE — ED PROVIDER NOTE - INTERPRETATION
Please tell pt the order is in there. He can wait until Monday when they call him and also he could be negative and then get symptoms 2d later? But understand if coaching may want to see if positive but asymptomatic? abnormal

## 2023-08-13 NOTE — H&P ADULT - MLM HIDDEN
A&Ox4. VSS. Denies pain. Nasal cannula 2 L. Up Ax1 with a walker. Purewick in place. IV lasix 80 mg BID. Fluid restriction 1800 mL. Tele afib RVR (hr 133). Cardiology following.Possible discharge days.    yes

## 2023-08-13 NOTE — PATIENT PROFILE ADULT - FALL HARM RISK - UNIVERSAL INTERVENTIONS
Bed in lowest position, wheels locked, appropriate side rails in place/Call bell, personal items and telephone in reach/Instruct patient to call for assistance before getting out of bed or chair/Non-slip footwear when patient is out of bed/[place] to call system/Physically safe environment - no spills, clutter or unnecessary equipment/Purposeful Proactive Rounding/Room/bathroom lighting operational, light cord in reach

## 2023-08-13 NOTE — ED ADULT NURSE NOTE - NSFALLUNIVINTERV_ED_ALL_ED
Bed/Stretcher in lowest position, wheels locked, appropriate side rails in place/Call bell, personal items and telephone in reach/Instruct patient to call for assistance before getting out of bed/chair/stretcher/Non-slip footwear applied when patient is off stretcher/[place] to call system/Physically safe environment - no spills, clutter or unnecessary equipment/Purposeful proactive rounding/Room/bathroom lighting operational, light cord in reach

## 2023-08-13 NOTE — PHARMACOTHERAPY INTERVENTION NOTE - COMMENTS
Medication history complete, reviewed medications with patient confirmed with doctor first med hx.

## 2023-08-13 NOTE — H&P ADULT - NSHPOUTPATIENTPROVIDERS_GEN_ALL_CORE
pcp- emile ramos  oncologist: Dr. Kim @ [facility]

## 2023-08-13 NOTE — H&P ADULT - HISTORY OF PRESENT ILLNESS
67 y/o male with PMHx of tracheal deviation, tracheal stenosis, thyromegaly, DDD, esophageal CA in remission (treated with chemotherapy last in 1/2023), coronary artery calcification, emphysema, BPH, sciatica, inguinal hernia presents to the ED c/o chest pain x2 days, yesterday with nausea and chest pressure. Currently c/o nausea and substernal left sided chest pain radiating to his left shoulder and down his arm. associated with nausea, decreased appetite.  had reflux in the past, usually not on ppi at home. Patient was hypertensive to systolic 180 last night on home reading; this morning patient's blood pressure was still elevated to 194 prompting visit to ED. No history of HTN, PE, or DVT.  no fever/chills, diarrhea/consitpation or focal weakness.      In ED, ecg- no acute changes.  tropx1-neg.  CTA chest, ab/pelvis- no acute etiology.

## 2023-08-13 NOTE — ED ADULT NURSE NOTE - NSICDXPASTSURGICALHX_GEN_ALL_CORE_FT
PAST SURGICAL HISTORY:  H/O inguinal hernia 2016    S/P tonsillectomy 5y/o    Varicocele right 1981

## 2023-08-13 NOTE — ED ADULT NURSE NOTE - OBJECTIVE STATEMENT
Patient presents to ED for chest pain/tightness x2 days ago, with radiation to left shoulder and arm. Endorses nausea, no vomiting or dizziness. Pt's wife endorses that patient has lost about 20 pounds since May. Denies fever/chills. Pt was on cipro for UTI/hematuria.

## 2023-08-13 NOTE — ED PROVIDER NOTE - CLINICAL SUMMARY MEDICAL DECISION MAKING FREE TEXT BOX
69 y/o male with substernal chest pain radiating to his left arm, new hypertension. Screening EKG, CXR, CT imaging, labs, and reevaluate. 69 y/o male with substernal chest pain radiating to his left arm, new hypertension. Screening EKG, CXR, CT imaging, labs, and reevaluate.    Ariana DO: CT neg for dissection; patient still hypertensive; will order dose of labetalol; endorsed to Dr. Gar for admission; all questions answered and addressed at bedside with wife.

## 2023-08-13 NOTE — PATIENT PROFILE ADULT - FALL HARM RISK - TYPE OF ASSESSMENT
Comprehensive Dental Treatment under General Anesthesia     Patient Name:  Ayleen David  Medical Record Number: [id number]  [employer] Number: [id number]  YOB: 2022  Date of Procedure: January 17, 2023    OPERATIVE REPORT              PREOPERATIVE DIAGNOSIS:  dental caries    POSTOPERATIVE DIAGNOSIS:  restored dental caries    COVID-19 status: not detected    FINDINGS: dental caries,  oral pathology noted--fistula between #G and #H    NAME OF PROCEDURE: Dental examination, radiographs, restorations, extractions, periodontal cleaning, and fluoride varnish under general anesthesia.    JOINT PROCEDURE WITH:  None    ATTENDING SURGEON: Carie Jenkins DDS, MSD, MS, MSD    ASSISTANT SURGEON: Josephine Del Valle DDS    DENTAL ASSISTANT: JADE Tenorio          ANESTHESIA:  General anesthesia with nasotracheal intubation.    MEDICATIONS:  Ancef/Cefazolin 250mg  Decadron/Dexamethasone 4mg  Precedex/Dexmedetomidine 8mcg  Fentanyl 15mcg  Toradol/Ketorolac 6mg  Zofran/Ondasetron 2mg  Propofol 10mg  Rocuronium 10mg  Sugamadex 25mg    ESTIMATED BLOOD LOSS:  4 ml     SPECIMENS: None    CONDITION:  Stable    INDICATIONS FOR PROCEDURE:  The patient is a 1 year old year old female who [facility] the [facility] for dental rehabilitation under general anesthesia.  Treatment in this setting was deemed necessary due to the child's extensive dental needs and an inability to cooperate for dental procedures in the office setting. The child has an insignificant medical history. The risks, benefits, and costs of dental rehabilitation under general anesthesia were discussed with the patient's parent and a decision was made to proceed with the procedure.      DESCRIPTION OF THE OPERATIVE PROCEDURE:  After informed consent was obtained and the patient was determined to be medically ready for the procedure, the child was transferred to the operating suite. General anesthesia  was induced.  A peripheral intravenous line was secured. The patient's airway was stabilized via nasotracheal intubation. The child was prepped and draped in the usual fashion for a dental procedure.   Dental radiographs consisting of 4 PAs and 2 Occlusals were taken. The radiographs revealed the following findings: dental caries and dental Infection    A moist pharyngeal throat pack was placed at 1123h. The teeth and surrounding tissues were decontaminated using 0.12% chlorhexidine gluconate mouthrinse applied with a toothbrush. A comprehensive oral and dental examination was completed. A dental prophylaxis was performed. A dental treatment plan was generated after taking into account the child's dental caries status, developing dentition and occlusion, and the patient's ability to cooperate for dental treatment in the office setting in the future. Restorative dentistry was performed under rubber dam isolation.  Dental caries were excavated from carious teeth.    #I restored with a stainless steel crown (size 4)  #L restored with a stainless steel crown (size 5)  #S restored with a stainless steel crown (size 5)  All stainless steel crowns were cemented with Ketac-Lawson glass ionomer cement.    Nonrestorable teeth #B, C, D, E, F, G, H were extracted without complications.  The extracted teeth were found to be free of pathology on visual inspection. Hemorrhage was minimal and controlled with gauze and digital pressure    Fluoride varnish was applied to the dentition.  The oral cavity was cleansed and all debris was removed. The pharyngeal throat pack was then removed at 1149h. The patient tolerated the procedure well, emerged uneventfully from anesthesia, was extubated in the operating room, and was transferred to the postanesthesia care unit in stable condition.      Note: asked parents to return to clinic in 3 mos for evaluation of eruption of #A and J; ideally Ayleen will get a nance as soon as they erupt.    The  attending doctor, Dr. Carie Jenkins, DDS, MSD, MS, MSD, was present throughout the procedure and involved in all treatment planning decisions. Explained treatment, prognosis and post-operative care with patient's parents and all questions answered. Follow up appointment recommendations given.          Admission

## 2023-08-13 NOTE — ED ADULT TRIAGE NOTE - CHIEF COMPLAINT QUOTE
pt ambulatory in ED, presents to ED with co chest pain yesterday , pt also reports elevated BP, +n/v, +shoulder pain radiated to wrist, HR in traige 66bpm, SP02 97% RA, Denies any falls STAT EKG in triage Pmhx esophageal CA

## 2023-08-13 NOTE — ED PROVIDER NOTE - OBJECTIVE STATEMENT
67 y/o male with PMHx of tracheal deviation, tracheal stenosis, thyromegaly, DDD, esophageal CA in remission (treated with chemotherapy last in 1/2023), coronary artery calcification, emphysema, BPH, sciatica, inguinal hernia presents to the ED c/o chest pain x2 days, yesterday with nausea and chest pressure. Currently c/o nausea and substernal pleuritic chest pain radiating to his left shoulder and down his arm. Patient was hypertensive to systolic 180 last night on home reading; this morning patient's blood pressure was still elevated to 194. Per wife, patient has lost a lot of weight over the summer. He recently had blood work @ PCP showing elevated thyroid panel. Wife endorses hematuria 2 weeks ago with subsequent CT abd/pelvis and treatment with Cipro. Patient has cystoscopy scheduled for next week. No history of HTN, PE, or DVT.  Oncologist: Dr. Kim @ [facility] 69 y/o male with PMHx of tracheal deviation, tracheal stenosis, thyromegaly, DDD, esophageal CA in remission (treated with chemotherapy last in 1/2023), coronary artery calcification, emphysema, BPH, sciatica, inguinal hernia presents to the ED c/o chest pain x2 days, yesterday with nausea and chest pressure. Currently c/o nausea and substernal left sided chest pain radiating to his left shoulder and down his arm. Patient was hypertensive to systolic 180 last night on home reading; this morning patient's blood pressure was still elevated to 194 prompting visit to ED. No history of HTN, PE, or DVT.  Oncologist: Dr. Kim @ [facility]

## 2023-08-14 ENCOUNTER — TRANSCRIPTION ENCOUNTER (OUTPATIENT)
Age: 68
End: 2023-08-14

## 2023-08-14 ENCOUNTER — INPATIENT (INPATIENT)
Facility: HOSPITAL | Age: 68
LOS: 2 days | Discharge: HOME | DRG: 287 | End: 2023-08-17
Attending: FAMILY MEDICINE | Admitting: INTERNAL MEDICINE
Payer: MEDICARE

## 2023-08-14 VITALS — HEIGHT: 71 IN | WEIGHT: 179.9 LBS

## 2023-08-14 VITALS — WEIGHT: 160.94 LBS

## 2023-08-14 DIAGNOSIS — Z92.21 PERSONAL HISTORY OF ANTINEOPLASTIC CHEMOTHERAPY: ICD-10-CM

## 2023-08-14 DIAGNOSIS — N32.0 BLADDER-NECK OBSTRUCTION: ICD-10-CM

## 2023-08-14 DIAGNOSIS — Z90.49 ACQUIRED ABSENCE OF OTHER SPECIFIED PARTS OF DIGESTIVE TRACT: ICD-10-CM

## 2023-08-14 DIAGNOSIS — E04.9 NONTOXIC GOITER, UNSPECIFIED: ICD-10-CM

## 2023-08-14 DIAGNOSIS — Z90.89 ACQUIRED ABSENCE OF OTHER ORGANS: Chronic | ICD-10-CM

## 2023-08-14 DIAGNOSIS — M47.816 SPONDYLOSIS WITHOUT MYELOPATHY OR RADICULOPATHY, LUMBAR REGION: ICD-10-CM

## 2023-08-14 DIAGNOSIS — I86.1 SCROTAL VARICES: Chronic | ICD-10-CM

## 2023-08-14 DIAGNOSIS — M54.30 SCIATICA, UNSPECIFIED SIDE: ICD-10-CM

## 2023-08-14 DIAGNOSIS — Z87.19 PERSONAL HISTORY OF OTHER DISEASES OF THE DIGESTIVE SYSTEM: Chronic | ICD-10-CM

## 2023-08-14 LAB
ALBUMIN SERPL ELPH-MCNC: 3.9 G/DL — SIGNIFICANT CHANGE UP (ref 3.3–5)
ALP SERPL-CCNC: 118 U/L — SIGNIFICANT CHANGE UP (ref 40–120)
ALT FLD-CCNC: 33 U/L — SIGNIFICANT CHANGE UP (ref 12–78)
ANION GAP SERPL CALC-SCNC: 6 MMOL/L — SIGNIFICANT CHANGE UP (ref 5–17)
ANION GAP SERPL CALC-SCNC: 8 MMOL/L — SIGNIFICANT CHANGE UP (ref 5–17)
APTT BLD: 30.5 SEC — SIGNIFICANT CHANGE UP (ref 24.5–35.6)
AST SERPL-CCNC: 23 U/L — SIGNIFICANT CHANGE UP (ref 15–37)
BASOPHILS # BLD AUTO: 0.07 K/UL — SIGNIFICANT CHANGE UP (ref 0–0.2)
BASOPHILS NFR BLD AUTO: 0.5 % — SIGNIFICANT CHANGE UP (ref 0–2)
BILIRUB SERPL-MCNC: 1.1 MG/DL — SIGNIFICANT CHANGE UP (ref 0.2–1.2)
BUN SERPL-MCNC: 24 MG/DL — HIGH (ref 7–23)
BUN SERPL-MCNC: 24 MG/DL — HIGH (ref 7–23)
CALCIUM SERPL-MCNC: 9.1 MG/DL — SIGNIFICANT CHANGE UP (ref 8.5–10.1)
CALCIUM SERPL-MCNC: 9.3 MG/DL — SIGNIFICANT CHANGE UP (ref 8.5–10.1)
CHLORIDE SERPL-SCNC: 106 MMOL/L — SIGNIFICANT CHANGE UP (ref 96–108)
CHLORIDE SERPL-SCNC: 107 MMOL/L — SIGNIFICANT CHANGE UP (ref 96–108)
CO2 SERPL-SCNC: 21 MMOL/L — LOW (ref 22–31)
CO2 SERPL-SCNC: 26 MMOL/L — SIGNIFICANT CHANGE UP (ref 22–31)
CREAT SERPL-MCNC: 1.14 MG/DL — SIGNIFICANT CHANGE UP (ref 0.5–1.3)
CREAT SERPL-MCNC: 1.35 MG/DL — HIGH (ref 0.5–1.3)
CULTURE RESULTS: SIGNIFICANT CHANGE UP
EGFR: 56 ML/MIN/1.73M2 — LOW
EGFR: 69 ML/MIN/1.73M2 — SIGNIFICANT CHANGE UP
EOSINOPHIL # BLD AUTO: 0.01 K/UL — SIGNIFICANT CHANGE UP (ref 0–0.5)
EOSINOPHIL NFR BLD AUTO: 0.1 % — SIGNIFICANT CHANGE UP (ref 0–6)
GLUCOSE SERPL-MCNC: 115 MG/DL — HIGH (ref 70–99)
GLUCOSE SERPL-MCNC: 122 MG/DL — HIGH (ref 70–99)
HCT VFR BLD CALC: 44.5 % — SIGNIFICANT CHANGE UP (ref 39–50)
HCT VFR BLD CALC: 48.8 % — SIGNIFICANT CHANGE UP (ref 39–50)
HCV AB S/CO SERPL IA: 0.05 S/CO — SIGNIFICANT CHANGE UP (ref 0–0.99)
HCV AB SERPL-IMP: SIGNIFICANT CHANGE UP
HGB BLD-MCNC: 15.7 G/DL — SIGNIFICANT CHANGE UP (ref 13–17)
HGB BLD-MCNC: 17.3 G/DL — HIGH (ref 13–17)
IMM GRANULOCYTES NFR BLD AUTO: 1.1 % — HIGH (ref 0–0.9)
INR BLD: 0.91 RATIO — SIGNIFICANT CHANGE UP (ref 0.85–1.18)
LIDOCAIN IGE QN: 105 U/L — SIGNIFICANT CHANGE UP (ref 73–393)
LYMPHOCYTES # BLD AUTO: 1.43 K/UL — SIGNIFICANT CHANGE UP (ref 1–3.3)
LYMPHOCYTES # BLD AUTO: 9.4 % — LOW (ref 13–44)
MCHC RBC-ENTMCNC: 30.1 PG — SIGNIFICANT CHANGE UP (ref 27–34)
MCHC RBC-ENTMCNC: 30.4 PG — SIGNIFICANT CHANGE UP (ref 27–34)
MCHC RBC-ENTMCNC: 35.3 GM/DL — SIGNIFICANT CHANGE UP (ref 32–36)
MCHC RBC-ENTMCNC: 35.5 GM/DL — SIGNIFICANT CHANGE UP (ref 32–36)
MCV RBC AUTO: 84.9 FL — SIGNIFICANT CHANGE UP (ref 80–100)
MCV RBC AUTO: 86.2 FL — SIGNIFICANT CHANGE UP (ref 80–100)
MONOCYTES # BLD AUTO: 0.69 K/UL — SIGNIFICANT CHANGE UP (ref 0–0.9)
MONOCYTES NFR BLD AUTO: 4.5 % — SIGNIFICANT CHANGE UP (ref 2–14)
NEUTROPHILS # BLD AUTO: 12.81 K/UL — HIGH (ref 1.8–7.4)
NEUTROPHILS NFR BLD AUTO: 84.4 % — HIGH (ref 43–77)
NT-PROBNP SERPL-SCNC: 287 PG/ML — HIGH (ref 0–125)
PLATELET # BLD AUTO: 300 K/UL — SIGNIFICANT CHANGE UP (ref 150–400)
PLATELET # BLD AUTO: 349 K/UL — SIGNIFICANT CHANGE UP (ref 150–400)
POTASSIUM SERPL-MCNC: 3.6 MMOL/L — SIGNIFICANT CHANGE UP (ref 3.5–5.3)
POTASSIUM SERPL-MCNC: 4 MMOL/L — SIGNIFICANT CHANGE UP (ref 3.5–5.3)
POTASSIUM SERPL-SCNC: 3.6 MMOL/L — SIGNIFICANT CHANGE UP (ref 3.5–5.3)
POTASSIUM SERPL-SCNC: 4 MMOL/L — SIGNIFICANT CHANGE UP (ref 3.5–5.3)
PROT SERPL-MCNC: 7.1 GM/DL — SIGNIFICANT CHANGE UP (ref 6–8.3)
PROTHROM AB SERPL-ACNC: 10.3 SEC — SIGNIFICANT CHANGE UP (ref 9.5–13)
RBC # BLD: 5.16 M/UL — SIGNIFICANT CHANGE UP (ref 4.2–5.8)
RBC # BLD: 5.75 M/UL — SIGNIFICANT CHANGE UP (ref 4.2–5.8)
RBC # FLD: 13 % — SIGNIFICANT CHANGE UP (ref 10.3–14.5)
RBC # FLD: 13.2 % — SIGNIFICANT CHANGE UP (ref 10.3–14.5)
SODIUM SERPL-SCNC: 135 MMOL/L — SIGNIFICANT CHANGE UP (ref 135–145)
SODIUM SERPL-SCNC: 139 MMOL/L — SIGNIFICANT CHANGE UP (ref 135–145)
SPECIMEN SOURCE: SIGNIFICANT CHANGE UP
TROPONIN I, HIGH SENSITIVITY RESULT: 14.46 NG/L — SIGNIFICANT CHANGE UP
WBC # BLD: 11.74 K/UL — HIGH (ref 3.8–10.5)
WBC # BLD: 15.18 K/UL — HIGH (ref 3.8–10.5)
WBC # FLD AUTO: 11.74 K/UL — HIGH (ref 3.8–10.5)
WBC # FLD AUTO: 15.18 K/UL — HIGH (ref 3.8–10.5)

## 2023-08-14 PROCEDURE — 93010 ELECTROCARDIOGRAM REPORT: CPT

## 2023-08-14 PROCEDURE — 99285 EMERGENCY DEPT VISIT HI MDM: CPT

## 2023-08-14 PROCEDURE — 99239 HOSP IP/OBS DSCHRG MGMT >30: CPT

## 2023-08-14 PROCEDURE — 93306 TTE W/DOPPLER COMPLETE: CPT | Mod: 26

## 2023-08-14 PROCEDURE — 71045 X-RAY EXAM CHEST 1 VIEW: CPT | Mod: 26

## 2023-08-14 PROCEDURE — 99222 1ST HOSP IP/OBS MODERATE 55: CPT

## 2023-08-14 RX ORDER — AMLODIPINE BESYLATE 2.5 MG/1
1 TABLET ORAL
Qty: 30 | Refills: 0
Start: 2023-08-14 | End: 2023-09-12

## 2023-08-14 RX ORDER — SUCRALFATE 1 G
1 TABLET ORAL ONCE
Refills: 0 | Status: COMPLETED | OUTPATIENT
Start: 2023-08-14 | End: 2023-08-14

## 2023-08-14 RX ORDER — NITROGLYCERIN 6.5 MG
0.4 CAPSULE, EXTENDED RELEASE ORAL
Refills: 0 | Status: DISCONTINUED | OUTPATIENT
Start: 2023-08-14 | End: 2023-08-17

## 2023-08-14 RX ORDER — FAMOTIDINE 10 MG/ML
20 INJECTION INTRAVENOUS ONCE
Refills: 0 | Status: COMPLETED | OUTPATIENT
Start: 2023-08-14 | End: 2023-08-15

## 2023-08-14 RX ORDER — FENTANYL CITRATE 50 UG/ML
25 INJECTION INTRAVENOUS ONCE
Refills: 0 | Status: DISCONTINUED | OUTPATIENT
Start: 2023-08-14 | End: 2023-08-14

## 2023-08-14 RX ORDER — AMLODIPINE BESYLATE 2.5 MG/1
5 TABLET ORAL DAILY
Refills: 0 | Status: DISCONTINUED | OUTPATIENT
Start: 2023-08-14 | End: 2023-08-14

## 2023-08-14 RX ADMIN — Medication 0.4 MILLIGRAM(S): at 22:55

## 2023-08-14 RX ADMIN — Medication 0.4 MILLIGRAM(S): at 22:35

## 2023-08-14 RX ADMIN — PANTOPRAZOLE SODIUM 40 MILLIGRAM(S): 20 TABLET, DELAYED RELEASE ORAL at 10:55

## 2023-08-14 RX ADMIN — Medication 25 MILLIGRAM(S): at 06:18

## 2023-08-14 RX ADMIN — FENTANYL CITRATE 25 MICROGRAM(S): 50 INJECTION INTRAVENOUS at 23:48

## 2023-08-14 RX ADMIN — Medication 1 GRAM(S): at 23:18

## 2023-08-14 NOTE — ED ADULT NURSE NOTE - ED CARDIAC RHYTHM
regular Information: Selecting Yes will display possible errors in your note based on the variables you have selected. This validation is only offered as a suggestion for you. PLEASE NOTE THAT THE VALIDATION TEXT WILL BE REMOVED WHEN YOU FINALIZE YOUR NOTE. IF YOU WANT TO FAX A PRELIMINARY NOTE YOU WILL NEED TO TOGGLE THIS TO 'NO' IF YOU DO NOT WANT IT IN YOUR FAXED NOTE.

## 2023-08-14 NOTE — CONSULT NOTE ADULT - NS ATTEND AMEND GEN_ALL_CORE FT
Patient seen and examined, agree with plan above.
68 year old man with esophageal cancer s/p chemo/xrt/albert liborio esophagectomy, admitted with nausea and chest pain.     ACS ruled out.   ?refluxed induced issues as post albert liborio, but this wouldn't cause blood pressure issues.   Either way, avril feels well now and is eating.   PPI orally once daily.

## 2023-08-14 NOTE — CONSULT NOTE ADULT - SUBJECTIVE AND OBJECTIVE BOX
CHIEF COMPLAINT: Patient is a 68y old  Male who presents with a chief complaint of chest pain (13 Aug 2023 13:58)    FROM H&P: 69 y/o male with PMHx of tracheal deviation, tracheal stenosis, thyromegaly, DDD, esophageal CA in remission (treated with chemotherapy last in 1/2023), coronary artery calcification, emphysema, BPH, sciatica, inguinal hernia presents to the ED c/o chest pain x2 days, yesterday with nausea and chest pressure. Currently c/o nausea and substernal left sided chest pain radiating to his left shoulder and down his arm. associated with nausea, decreased appetite.  had reflux in the past, usually not on ppi at home. Patient was hypertensive to systolic 180 last night on home reading; this morning patient's blood pressure was still elevated to 194 prompting visit to ED. No history of HTN, PE, or DVT.  no fever/chills, diarrhea/consitpation or focal weakness.      In ED, ecg- no acute changes.  tropx1-neg.  CTA chest, ab/pelvis- no acute etiology. (13 Aug 2023 13:58)    8/14. Feeling better, no complaints  No further Ches pressure. BP stable.      PREVIOUS DIAGNOSTIC TESTING:    last Stress test 2019 neg, echo w/ NVLEF in June 2023    MEDICATIONS  (STANDING):  amLODIPine   Tablet 5 milliGRAM(s) Oral daily  hydrALAZINE 25 milliGRAM(s) Oral three times a day  pantoprazole  Injectable 40 milliGRAM(s) IV Push daily    MEDICATIONS  (PRN):  acetaminophen     Tablet .. 650 milliGRAM(s) Oral every 6 hours PRN Mild Pain (1 - 3)  aluminum hydroxide/magnesium hydroxide/simethicone Suspension 30 milliLiter(s) Oral every 6 hours PRN Dyspepsia  enalaprilat Injectable 1.25 milliGRAM(s) IV Push every 6 hours PRN SBP>150  famotidine    Tablet 20 milliGRAM(s) Oral daily PRN acid reflux  metoclopramide 10 milliGRAM(s) Oral two times a day PRN nausea  sucralfate 1 Gram(s) Oral four times a day PRN acid reflux    Home Medications:  tadalafil 2.5 mg oral tablet: 1 tab(s) orally once a day as needed for (13 Aug 2023 13:35)  Vyvanse 70 mg oral capsule: 1 cap(s) orally as needed for (13 Aug 2023 13:35)    PHYSICAL EXAM:  Vital Signs Last 24 Hrs  T(C): 36.8 (14 Aug 2023 08:30), Max: 37 (13 Aug 2023 20:33)  T(F): 98.2 (14 Aug 2023 08:30), Max: 98.6 (13 Aug 2023 20:33)  HR: 67 (14 Aug 2023 08:30) (55 - 89)  BP: 120/62 (14 Aug 2023 08:30) (91/60 - 188/86)  RR: 19 (14 Aug 2023 08:30) (16 - 19)  SpO2: 96% (14 Aug 2023 08:30) (96% - 100%)    Parameters below as of 14 Aug 2023 08:30  Patient On (Oxygen Delivery Method): room air    Constitutional: NAD, awake and alert  HEENT: PERR, EOMI, Normal Hearing, MMM  Neck: Soft and supple, No LAD, No JVD  Respiratory: Breath sounds are clear bilaterally, No wheezing, rales or rhonchi  Cardiovascular: S1 and S2, regular rate and rhythm, no Murmurs, gallops or rubs  Gastrointestinal: Bowel Sounds present, soft, nontender, nondistended, no guarding, no rebound  Extremities: No peripheral edema  Vascular: 2+ peripheral pulses  Neurological: A/O x 3, no focal deficits  Musculoskeletal: 5/5 strength b/l upper and lower extremities  Skin: No rashes    =======================================    INTERPRETATION OF TELEMETRY: SR     ECG: < from: 12 Lead ECG (08.13.23 @ 11:08) >  Normal sinus rhythm  RSR' or QR pattern in V1 suggests right ventricular conduction delay    ========================================    LABS:                        15.7   11.74 )-----------( 300      ( 14 Aug 2023 07:37 )             44.5     08-14    139  |  107  |  24<H>  ----------------------------<  115<H>  3.6   |  26  |  1.35<H>    Ca    9.1      14 Aug 2023 07:37  Mg     1.9     08-13    TPro  7.1  /  Alb  4.0  /  TBili  1.0  /  DBili  x   /  AST  22  /  ALT  36  /  AlkPhos  117  08-13    PT/INR - ( 13 Aug 2023 10:41 )   PT: 10.3 sec;   INR: 0.91 ratio       PTT - ( 13 Aug 2023 10:41 )  PTT:28.2 sec    < from: TTE Echo Complete w/o Contrast w/ Doppler (08.14.23 @ 08:52) >   The left ventricle is normal in size and contractility with LVEF   estimated   at 65%. Mild diastolic dysfunction (stage I).   Mild concentric left ventricular hypertrophy.   Normal appearing right ventricle structure and function.   Mildaortic regurgitation.   Mild tricuspid valve regurgitation.    < end of copied text >        RADIOLOGY & ADDITIONAL STUDIES:    < from: Xray Chest 1 View- PORTABLE-Urgent (Xray Chest 1 View- PORTABLE-Urgent .) (08.13.23 @ 11:07) >  No acute pulmonary pathology.    < from: CT Angio Abdomen and Pelvis w/ IV Cont (08.13.23 @ 11:02) >  No aortic dissection.    Coronary atherosclerosis.

## 2023-08-14 NOTE — DISCHARGE NOTE PROVIDER - NSDCFUSCHEDAPPT_GEN_ALL_CORE_FT
[facility]  UROLOGY [address] [place] [address]  Scheduled Appointment: 08/17/2023    Rich Olmos  [facility]  UROLOGY [address] [place] [address]  Scheduled Appointment: 08/17/2023

## 2023-08-14 NOTE — CONSULT NOTE ADULT - ASSESSMENT
69 y/o male with PMHx of tracheal deviation, tracheal stenosis, thyromegaly, DDD, esophageal CA in remission (treated with chemotherapy last in 1/2023), coronary artery calcification, emphysema, BPH, sciatica, inguinal hernia presents to the ED c/o chest pain x2 days, yesterday with nausea and chest pressure. Currently c/o nausea and substernal left sided chest pain radiating to his left shoulder and down his arm. associated with nausea, decreased appetite.  had reflux in the past, usually not on ppi at home. Patient was hypertensive to systolic 180 last night on home reading; this morning patient's blood pressure was still elevated to 194 prompting visit to ED. No history of HTN, PE, or DVT.  no fever/chills, diarrhea/ constipation or focal weakness.      #Chest pressure in the setting of HTN urgency  #HTN urgency    Monitor on tele. SR, no events  Troponin negative x3, EKG without ischemic changes  Echo 8/14 EF 65%, mild TR  CT Chest/ Abd/ Pelvis - no aortic dissection, no pulmonary pathology. Noted with coronary arthrosclerosis c/w asa daily.   F/u lipid panel, A1c  Recommend further outpatient testing - ischemic evaluation  C/w Norvasc 10mg daily for BP control, monitor BP at home.   Close f/u after discharge.     Case d/w Dr. Lee 67 y/o male with PMHx of tracheal deviation, tracheal stenosis, thyromegaly, DDD, esophageal CA in remission (treated with chemotherapy last in 1/2023), coronary artery calcification, emphysema, BPH, sciatica, inguinal hernia presents to the ED c/o chest pain x2 days, yesterday with nausea and chest pressure. Currently c/o nausea and substernal left sided chest pain radiating to his left shoulder and down his arm. associated with nausea, decreased appetite.  had reflux in the past, usually not on ppi at home. Patient was hypertensive to systolic 180 last night on home reading; this morning patient's blood pressure was still elevated to 194 prompting visit to ED. No history of HTN, PE, or DVT.  no fever/chills, diarrhea/ constipation or focal weakness.      #Chest pressure in the setting of HTN urgency  #HTN urgency    Monitor on tele. SR, no events  Troponin negative x3, EKG without ischemic changes  Echo 8/14 EF 65%, mild TR  CT Chest/ Abd/ Pelvis - no aortic dissection, no pulmonary pathology. Noted with coronary arthrosclerosis c/w asa daily.   F/u lipid panel, A1c  Recommend further outpatient testing - ischemic evaluation  C/w Norvasc 5 mg daily for BP control, monitor BP at home.   Close f/u after discharge.     Case d/w Dr. Lee

## 2023-08-14 NOTE — ED ADULT NURSE NOTE - OBJECTIVE STATEMENT
Pt presents to ED c/o CP and high blood pressures taken at home. Pt is Ao4 and speaking in full sentences. Pt endorsing chest pain at the sternal region and states "my left arm feels a little funny." Pt denies any head aches or vision changes. EKG done at bedside, patient placed on cardiac monitor. Pt has unaccessed port on L anterior chest wall with hx Esophogeal Cancer. Pt hypertensive at this time, MD Tinoco aware.

## 2023-08-14 NOTE — DIETITIAN INITIAL EVALUATION ADULT - OTHER INFO
History of Present Illness:   67 y/o male with PMHx of tracheal deviation, tracheal stenosis, thyromegaly, DDD, esophageal CA in remission (treated with chemotherapy last in 1/2023), coronary artery calcification, emphysema, BPH, sciatica, inguinal hernia presents to the ED c/o chest pain x2 days, yesterday with nausea and chest pressure. Currently c/o nausea and substernal left sided chest pain radiating to his left shoulder and down his arm. associated with nausea, decreased appetite.  had reflux in the past, usually not on ppi at home. Patient was hypertensive to systolic 180 last night on home reading; this morning patient's blood pressure was still elevated to 194 prompting visit to ED. No history of HTN, PE, or DVT.  no fever/chills, diarrhea/consitpation or focal weakness.     Admit dx    chest pain  Visited with pt, wife at bedside  Pt reports that since March, he has been eating as much as he thinks he usually does, but losing weight  NFPE reveals muscle wasting, fat wasting   PO intake estimated < 75% ENN > one month  Recommendations to follow in Plan/Intervention             69 y/o male with PMHx of tracheal deviation, tracheal stenosis, thyromegaly, DDD, esophageal CA in remission (treated with chemotherapy last in 1/2023), coronary artery calcification, emphysema, BPH, sciatica, inguinal hernia presents to the ED c/o chest pain x2 days, yesterday with nausea and chest pressure. Currently c/o nausea and substernal left sided chest pain radiating to his left shoulder and down his arm. associated with nausea, decreased appetite.  had reflux in the past, usually not on ppi at home. Patient was hypertensive to systolic 180 last night on home reading; this morning patient's blood pressure was still elevated to 194 prompting visit to ED. No history of HTN, PE, or DVT.  no fever/chills, diarrhea/consitpation or focal weakness.     RD bedscale wt 73 kg 160#  Pt visited with wife at bedside  Pt reports that he thought he was eating normally but he lost weight  NFPE reveals muscle wasting, fat wasting  PO intake estimated < 75% ENN > one month   Pt reports wt loss of 26 # in 4 months  Recommendations to follow in Plan/Intervention

## 2023-08-14 NOTE — DISCHARGE NOTE PROVIDER - HOSPITAL COURSE
FROM H&P: 69 y/o male with PMHx of tracheal deviation, tracheal stenosis, thyromegaly, DDD, esophageal CA in remission (treated with chemotherapy last in 1/2023), coronary artery calcification, emphysema, BPH, sciatica, inguinal hernia presents to the ED c/o chest pain x2 days, yesterday with nausea and chest pressure. Currently c/o nausea and substernal left sided chest pain radiating to his left shoulder and down his arm. associated with nausea, decreased appetite.  had reflux in the past, usually not on ppi at home. Patient was hypertensive to systolic 180 last night on home reading; this morning patient's blood pressure was still elevated to 194 prompting visit to ED. No history of HTN, PE, or DVT.  no fever/chills, diarrhea/consitpation or focal weakness.      In ED, ecg- no acute changes.  tropx1-neg.  CTA chest, ab/pelvis- no acute etiology. (13 Aug 2023 13:58)    Hospital Course   -Pt was admitted for Hypertensive Urgency. Patient BP was optimized. Trop x3: negative. There were no events on tele. Echo 8/14 EF 65%, mild TR  CT Chest/ Abd/ Pelvis - no aortic dissection, no pulmonary pathology. Noted with coronary arthrosclerosis c/w asa daily. Pt was evaluated by Cardiologist and at this time patient is medically optimized to be discharged home       8/14. Feeling better, no complaints  No further chest  pressure. BP stable.    Vital Signs Last 24 Hrs  T(C): 36.8 (08-14-23 @ 08:30), Max: 37 (08-13-23 @ 20:33)  T(F): 98.2 (08-14-23 @ 08:30), Max: 98.6 (08-13-23 @ 20:33)  HR: 67 (08-14-23 @ 08:30) (55 - 89)  BP: 120/62 (08-14-23 @ 08:30) (91/60 - 182/86)  BP(mean): --  RR: 19 (08-14-23 @ 08:30) (16 - 19)  SpO2: 96% (08-14-23 @ 08:30) (96% - 100%)      PHYSICAL EXAM   Gen: NAD, comfortable, AA&Ox4  HEENT: head atrumatic and normocephalic, PEERLA, EOMI,  no gross abnormalities of ears, mucous membranes moist, no oral lesions, neck supple without masses/goiter/lymphadenopathy, no JVD  CVS: +s1, s2, regular rate and rhythm, no murmurs, rubs or gallops, no thrill, normal PMI  Pulmonary: normal respiratory effort, clear to auscultation b/l, no wheezes/crackles/rhonchi  Abdomen: soft, non-tender, non-distended, +bowel sounds in all 4 quadrants, no masses noted, no guarding or rigidity   Back: no scoliosis, lordosis, or kyphosis, no point tenderness, no CVA tenderness   Extremities: no pedal edema, pedal pulses palpable, <2 sec. cap refill   Skin: nl warm and dry, no wounds   Neuro: answering questions appropriately, face symmetric, sensation equal bilaterally in face, tongue midline, no dysarthria, 5/5 strength in upper and lower extremities bilaterally, sensation intact in upper and lower extremities bilaterally, nl finger to nose, and nl heel to shin     #Chest pressure/pain likely secondary to Hypertensive Urgency  -c/w Norvasc   -Trops x3: negative   -ECHO: reviewed   -a/w ASA   -cardiology consult and recommendation noted     Esophageal CA in remission (treated with chemotherapy last in 1/2023), s/p Esophagectomy with gastric pull-through  -f/u as per outpatient       #Incidental CT findings   -Several pulmonary nodules measuring up to 0.5 cm   -Subcentimeter right renal hypodensity and  subcentimeter hepatic hypodensities are too small to characterize  -Enlarged prostate. There appears to be mild circumferential urinary bladder wall thickening, which may be related to bladder chronic outlet   obstruction.  Enlarged, heterogeneous left thyroid lobe with substernal extension of probable goiter     f/u as outpatient

## 2023-08-14 NOTE — DISCHARGE NOTE PROVIDER - CARE PROVIDER_API CALL
Víctor Jasmine.  Internal Medicine  [address] [place] [address]  Phone: [phone]  Fax: [phone]  Established Patient  Follow Up Time: 1 week

## 2023-08-14 NOTE — ED PROVIDER NOTE - PROGRESS NOTE DETAILS
so from Dr. Tinoco, cta chest pending r/o dissection, cta negative, called and spoke to dr. Castillo for admit, Pt updated on ct results GODFREY Hu DO

## 2023-08-14 NOTE — ED PROVIDER NOTE - CLINICAL SUMMARY MEDICAL DECISION MAKING FREE TEXT BOX
adult male comes in with CP was recently discharged from hospital several hrs ago with three negative troponin; CP resolved and cardiology consult at that time recommended out pt cariology followup. EKG negative for acute pathology; CTA negative for PE, showed coronary atherosclerosis. here, vitals are normal with the exception of hypertension 160/109. exams unremarkable. plan repeat cardiac enzymes, pain control, blood pressure control, and reassess. if workup negative today and feels better will have cardiology appointment tomorrow at 9AM

## 2023-08-14 NOTE — DIETITIAN INITIAL EVALUATION ADULT - ETIOLOGY
r/t inability to consume sufficient energy/protein 2/2 thyromegaly,  other possible unknown etiology

## 2023-08-14 NOTE — DIETITIAN INITIAL EVALUATION ADULT - NS FNS DIET ORDER
Diet, Regular:   Low Fat (LOWFAT)     Special Instructions for Nursing:  Low Fat (LOWFAT) (08-13-23 @ 13:02)

## 2023-08-14 NOTE — DIETITIAN INITIAL EVALUATION ADULT - PERTINENT MEDS FT
MEDICATIONS  (STANDING):  amLODIPine   Tablet 5 milliGRAM(s) Oral daily  pantoprazole  Injectable 40 milliGRAM(s) IV Push daily    MEDICATIONS  (PRN):  acetaminophen     Tablet .. 650 milliGRAM(s) Oral every 6 hours PRN Mild Pain (1 - 3)  aluminum hydroxide/magnesium hydroxide/simethicone Suspension 30 milliLiter(s) Oral every 6 hours PRN Dyspepsia  famotidine    Tablet 20 milliGRAM(s) Oral daily PRN acid reflux  metoclopramide 10 milliGRAM(s) Oral two times a day PRN nausea  sucralfate 1 Gram(s) Oral four times a day PRN acid reflux

## 2023-08-14 NOTE — DIETITIAN INITIAL EVALUATION ADULT - ADD RECOMMEND
Liberalize diet to Regular to optimize po/nutrient intake.   Record PO intake in EMR after each meal (nursing.)   Consider adding thiamine 100 mg daily 2/2 poor PO intake/ malnutrition  Monitor bowel movements, if no BM for >3 days, consider implementing bowel regimen.  Monitor PO intake, tolerance, labs and weight.         MVI w/ minerals daily to ensure 100% RDA met

## 2023-08-14 NOTE — DIETITIAN INITIAL EVALUATION ADULT - PERTINENT LABORATORY DATA
08-14    139  |  107  |  24<H>  ----------------------------<  115<H>  3.6   |  26  |  1.35<H>    Ca    9.1      14 Aug 2023 07:37  Mg     1.9     08-13    TPro  7.1  /  Alb  4.0  /  TBili  1.0  /  DBili  x   /  AST  22  /  ALT  36  /  AlkPhos  117  08-13

## 2023-08-14 NOTE — DIETITIAN NUTRITION RISK NOTIFICATION - ADDITIONAL COMMENTS/DIETITIAN RECOMMENDATIONS
Liberalize diet to Regular to optimize po/nutrient intake.   Record PO intake in EMR after each meal (nursing.)   Consider adding thiamine 100 mg daily 2/2 poor PO intake/ malnutrition  Monitor bowel movements, if no BM for >3 days, consider implementing bowel regimen.  Monitor PO intake, tolerance, labs and weight.

## 2023-08-14 NOTE — DISCHARGE NOTE PROVIDER - NSDCCPCAREPLAN_GEN_ALL_CORE_FT
PRINCIPAL DISCHARGE DIAGNOSIS  Diagnosis: Hypertensive urgency  Assessment and Plan of Treatment: You were started on Amlodopine 5mg. Please continue to monitor your blood pressure. Please follow up with your PCP/Cardiologist soon after discharge      SECONDARY DISCHARGE DIAGNOSES  Diagnosis: Abnormal CT of the abdomen  Assessment and Plan of Treatment: #Incidental CT findings   -Several pulmonary nodules measuring up to 0.5 cm   -Subcentimeter right renal hypodensity and  subcentimeter hepatic hypodensities are too small to characterize  -Enlarged prostate. There appears to be mild circumferential urinary bladder wall thickening, which may be related to bladder chronic outlet   obstruction.  Enlarged, heterogeneous left thyroid lobe with substernal extension of probable goiter   f/u as outpatient with your PCP soon after discharge     Go for blood tests as directed. Your doctor will do lab tests at regular visits to monitor the effects of this medicine. Please follow up with your doctor and keep your health care provider appointments.

## 2023-08-14 NOTE — CONSULT NOTE ADULT - ASSESSMENT
68 year old male with history esophageal cancer s/p esophagectomy with gastric pull through and chemo presenting with acute chest pain, nausea, hypertension    Imp: Nausea chest pain r/t hypertensive urgency versus reflux    Clinically improved. No complaints. Tolerating diet.    Rec:  ::Continue BP control per cardiology  ::DC to home on Omeprazole daily     68 year old male with history esophageal cancer s/p chemo/xrt/esophagectomy with gastric pull-up and presenting with acute chest pain, nausea, hypertension    Imp: Nausea chest pain r/t hypertensive urgency versus reflux    Clinically improved without much intervention. No complaints. Tolerating diet.    Rec:  ::Continue BP control per cardiology  ::DC to home on Omeprazole daily

## 2023-08-14 NOTE — DIETITIAN INITIAL EVALUATION ADULT - ORAL INTAKE PTA/DIET HISTORY
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  PO intake estimated < 75% ENN > one month.  Pt reports that he didn't think he changed his eating habits,  but lost weight.  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   Pt reports that he didn't think he changed his eating habits,  but lost weight. PO intake estimated < 75% ENN > one month.

## 2023-08-14 NOTE — DIETITIAN INITIAL EVALUATION ADULT - NAME AND PHONE
Maranda Alonso RDN, CDN, [facility]      [phone]   [email or web address]

## 2023-08-14 NOTE — CONSULT NOTE ADULT - SUBJECTIVE AND OBJECTIVE BOX
Patient is a 68y old  Male who presents with a chief complaint of Chest pain    HPI:  This is a 68 year old male with significant past medical history of tracheal deviation, tracheal stenosis, thyromegaly, DDD, esophageal CA in remission s/p chemo last rx 1/2023 and esophagectomy with gastric pullthrough presenting to [facility] with report of chest pain, high blood pressure. At bedside this morning patient denying any issue. States that evening prior to admission he had made himself a "creamsicle" with orange soda and vanilla ice cream and awoke with nausea and chest pressure. Denies vomiting. Had piece of toast and slept until following day. At that point still had residual nausea and pressure in chest- wife took BP and was elevated to 160 systolic prompting presentation to ED. Started here on antihypertensives and titrating. ACS ruled out. Tolerating regular diet. Denies any further nausea or chest pain.      PAST MEDICAL & SURGICAL HISTORY:  Inguinal hernia      Sciatica      Benign prostatic hyperplasia      Testosterone deficiency      Right arm fracture      Emphysema lung      Coronary artery calcification      Esophageal dilatation      DDD (degenerative disc disease), lumbar      Thyromegaly      Tracheal stenosis      Tracheal deviation      Varicocele  right 1981      S/P tonsillectomy  5y/o      H/O inguinal hernia  2016          MEDICATIONS  (STANDING):  amLODIPine   Tablet 5 milliGRAM(s) Oral daily  pantoprazole  Injectable 40 milliGRAM(s) IV Push daily    MEDICATIONS  (PRN):  acetaminophen     Tablet .. 650 milliGRAM(s) Oral every 6 hours PRN Mild Pain (1 - 3)  aluminum hydroxide/magnesium hydroxide/simethicone Suspension 30 milliLiter(s) Oral every 6 hours PRN Dyspepsia  famotidine    Tablet 20 milliGRAM(s) Oral daily PRN acid reflux  metoclopramide 10 milliGRAM(s) Oral two times a day PRN nausea  sucralfate 1 Gram(s) Oral four times a day PRN acid reflux      Allergies    No Known Allergies    Intolerances        SOCIAL HISTORY:    FAMILY HISTORY:      REVIEW OF SYSTEMS:    CONSTITUTIONAL: No weakness, fevers or chills  EYES/ENT: No visual changes;  No vertigo or throat pain   NECK: No pain or stiffness  RESPIRATORY: No cough, wheezing, hemoptysis; No shortness of breath  CARDIOVASCULAR: No chest pain or palpitations  GASTROINTESTINAL: See HPI  GENITOURINARY: No dysuria, frequency or hematuria  NEUROLOGICAL: No numbness or weakness  SKIN: No itching, burning, rashes, or lesions   PSYCH: Normal mood and affect  All other review of systems is negative unless indicated above.    Vital Signs Last 24 Hrs  T(C): 36.8 (14 Aug 2023 08:30), Max: 37 (13 Aug 2023 20:33)  T(F): 98.2 (14 Aug 2023 08:30), Max: 98.6 (13 Aug 2023 20:33)  HR: 67 (14 Aug 2023 08:30) (59 - 89)  BP: 120/62 (14 Aug 2023 08:30) (91/60 - 182/86)  BP(mean): --  RR: 19 (14 Aug 2023 08:30) (16 - 19)  SpO2: 96% (14 Aug 2023 08:30) (96% - 100%)    Parameters below as of 14 Aug 2023 08:30  Patient On (Oxygen Delivery Method): room air        PHYSICAL EXAM:    Constitutional: No acute distress, well-developed, non-toxic appearing  HEENT: masked, good phonation, not icteric  Neck: supple, no lymphadenopathy  Respiratory: clear to ascultation bilaterally, no wheezing  Cardiovascular: S1 and S2, regular rate and rhythm, no murmurs rubs or gallops  Gastrointestinal: soft, non-tender, non-distended, +bowel sounds, no rebound or guarding, no surgical scars, no drains  Extremities: No peripheral edema, no cyanosis or clubbing  Vascular: 2+ peripheral pulses, no venous stasis  Neurological: A/O x 3, no focal deficits, no asterixis  Psychiatric: Normal mood, normal affect  Skin: No rashes, not jaundiced    LABS:                        15.7   11.74 )-----------( 300      ( 14 Aug 2023 07:37 )             44.5     08-14    139  |  107  |  24<H>  ----------------------------<  115<H>  3.6   |  26  |  1.35<H>    Ca    9.1      14 Aug 2023 07:37  Mg     1.9     08-13    TPro  7.1  /  Alb  4.0  /  TBili  1.0  /  DBili  x   /  AST  22  /  ALT  36  /  AlkPhos  117  08-13    PT/INR - ( 13 Aug 2023 10:41 )   PT: 10.3 sec;   INR: 0.91 ratio         PTT - ( 13 Aug 2023 10:41 )  PTT:28.2 sec  LIVER FUNCTIONS - ( 13 Aug 2023 10:41 )  Alb: 4.0 g/dL / Pro: 7.1 gm/dL / ALK PHOS: 117 U/L / ALT: 36 U/L / AST: 22 U/L / GGT: x             RADIOLOGY & ADDITIONAL STUDIES: Patient is a 68y old  Male who presents with a chief complaint of Chest pain    HPI:  This is a 68 year old male with significant past medical history of tracheal deviation, tracheal stenosis, thyromegaly, DDD, esophageal CA s/p chemo/xrt and albert liborio esophagecomy, admitted with  report of chest pain and high blood pressure.     At bedside this morning patient denying any issue. States Saturday afternoon  prior to admission he was feeling unwell and tired. Couldn't point a specific symptom or factors. His wife took his pressures at the time and he had a systolic in the 160's to 190's. he had made himself a "creamsicle" with orange soda and vanilla ice cream and went to bed, he slept all day and  awoke  in the evening with nausea and chest pressure. Pressure was constant, also with burning sensation, and had no relation to activity and was not associated with shrortness of breath .Denies vomiting. Had piece of toast and slept until following day. At that point still had residual nausea and pressure in chest- wife took BP and was elevated to 160 systolic prompting presentation to ED. Started here on antihypertensives and titrating. ACS ruled out. Currently tolerating regular diet. Denies any further nausea or chest pain.      PAST MEDICAL & SURGICAL HISTORY:  Inguinal hernia      Sciatica      Benign prostatic hyperplasia      Testosterone deficiency      Right arm fracture      Emphysema lung      Coronary artery calcification      Esophageal dilatation      DDD (degenerative disc disease), lumbar      Thyromegaly      Tracheal stenosis      Tracheal deviation      Varicocele  right 1981      S/P tonsillectomy  5y/o      H/O inguinal hernia  2016    esophgectomy-albert liborio    chemo/xrt for esophageal cancer        MEDICATIONS  (STANDING):  amLODIPine   Tablet 5 milliGRAM(s) Oral daily  pantoprazole  Injectable 40 milliGRAM(s) IV Push daily    MEDICATIONS  (PRN):  acetaminophen     Tablet .. 650 milliGRAM(s) Oral every 6 hours PRN Mild Pain (1 - 3)  aluminum hydroxide/magnesium hydroxide/simethicone Suspension 30 milliLiter(s) Oral every 6 hours PRN Dyspepsia  famotidine    Tablet 20 milliGRAM(s) Oral daily PRN acid reflux  metoclopramide 10 milliGRAM(s) Oral two times a day PRN nausea  sucralfate 1 Gram(s) Oral four times a day PRN acid reflux      Allergies    No Known Allergies    Intolerances    SOCIAL HISTORY:  no smoking, drinking or drugs    FAMILY HISTORY:  no colon or stomach cancer      REVIEW OF SYSTEMS:    CONSTITUTIONAL: No weakness, fevers or chills  EYES/ENT: No visual changes;  No vertigo or throat pain   NECK: No pain or stiffness  RESPIRATORY: No cough, wheezing, hemoptysis; No shortness of breath  CARDIOVASCULAR: see HPI  GASTROINTESTINAL: See HPI  GENITOURINARY: No dysuria, frequency or hematuria  NEUROLOGICAL: No numbness or weakness  SKIN: No itching, burning, rashes, or lesions   PSYCH: Normal mood and affect  All other review of systems is negative unless indicated above.    Vital Signs Last 24 Hrs  T(C): 36.8 (14 Aug 2023 08:30), Max: 37 (13 Aug 2023 20:33)  T(F): 98.2 (14 Aug 2023 08:30), Max: 98.6 (13 Aug 2023 20:33)  HR: 67 (14 Aug 2023 08:30) (59 - 89)  BP: 120/62 (14 Aug 2023 08:30) (91/60 - 182/86)  BP(mean): --  RR: 19 (14 Aug 2023 08:30) (16 - 19)  SpO2: 96% (14 Aug 2023 08:30) (96% - 100%)    Parameters below as of 14 Aug 2023 08:30  Patient On (Oxygen Delivery Method): room air    PHYSICAL EXAM:    Constitutional: No acute distress, well-developed, non-toxic appearing, lying in bed on phone and smiling, just at full meal  HEENT: unmasked, good phonation, not icteric  Neck: supple, no lymphadenopathy  Respiratory: clear to ascultation bilaterally, no wheezing  Cardiovascular: S1 and S2, regular rate and rhythm, no murmurs rubs or gallops  Gastrointestinal: soft, non-tender, non-distended, +bowel sounds, no rebound or guarding, + surgical scars, no drains  Extremities: No peripheral edema, no cyanosis or clubbing  Vascular: 2+ peripheral pulses, no venous stasis  Neurological: A/O x 3, no focal deficits, no asterixis  Psychiatric: Normal mood, normal affect  Skin: No rashes, not jaundiced    LABS:                        15.7   11.74 )-----------( 300      ( 14 Aug 2023 07:37 )             44.5     08-14    139  |  107  |  24<H>  ----------------------------<  115<H>  3.6   |  26  |  1.35<H>    Ca    9.1      14 Aug 2023 07:37  Mg     1.9     08-13    TPro  7.1  /  Alb  4.0  /  TBili  1.0  /  DBili  x   /  AST  22  /  ALT  36  /  AlkPhos  117  08-13    PT/INR - ( 13 Aug 2023 10:41 )   PT: 10.3 sec;   INR: 0.91 ratio         PTT - ( 13 Aug 2023 10:41 )  PTT:28.2 sec  LIVER FUNCTIONS - ( 13 Aug 2023 10:41 )  Alb: 4.0 g/dL / Pro: 7.1 gm/dL / ALK PHOS: 117 U/L / ALT: 36 U/L / AST: 22 U/L / GGT: x

## 2023-08-14 NOTE — ED ADULT TRIAGE NOTE - CHIEF COMPLAINT QUOTE
Pt. presents to ED c/o constant mid sternal chest pain for the last 15-20 minutes. Pt. reports being seen in ED today for hypertension and mild chest pain. Went home and then pain began again and worsened. pt. reports BP at home was 199/105. Pt. taken for immediate EKG

## 2023-08-14 NOTE — DIETITIAN NUTRITION RISK NOTIFICATION - TREATMENT: THE FOLLOWING DIET HAS BEEN RECOMMENDED
Diet, Regular:   Low Fat (LOWFAT)     Special Instructions for Nursing:  Low Fat (LOWFAT) (08-13-23 @ 13:02) [Active]

## 2023-08-14 NOTE — DISCHARGE NOTE NURSING/CASE MANAGEMENT/SOCIAL WORK - NSDCPEFALRISK_GEN_ALL_CORE
For information on Fall & Injury Prevention, visit: [email or web address] OR  [email or web address] OR  https://www.cdc.gov/steadi/patient.html

## 2023-08-14 NOTE — ED PROVIDER NOTE - OBJECTIVE STATEMENT
69 y/o male w/PMHx of BPH, inguinal hernia, sciatica, emphysema, DDD, variocele, testosterone deficiency, thyromegaly, and coronary artery calcification presents to the ED c/o constant mid sternal chest pain for the last 15-20 minutes. Pt. reports being seen in ED today for hypertension and mild chest pain. Went home and then pain began again and worsened. pt. reports BP at home was 199/105. 69 y/o male w/PMHx of BPH, inguinal hernia, esophageal CA in remission, sciatica, emphysema, DDD, variocele, testosterone deficiency, thyromegaly, and coronary artery calcification presents to the ED c/o constant mid sternal chest pain for the last 15-20 minutes. Pt. reports being seen in ED today for hypertension and mild chest pain. all cardiac enzymes came back negative. Went home and then pain began again and worsened. pt reports BP at home was 199/105. Endorsing constant chest pain. no SOB, cough, or nausea. has been feeling cold sweats. states pain primarily stays in chest but felt it radiating to back earlier. light tingling in fingers. is not a smoker. pt didn't take anything for pain. no hx of high bp. felt that amlodipine improved symptoms. was also given nitropaste for relief. few months ago started losing weight after dx with hyperthyroid. Dr Hodge cardiologist.

## 2023-08-14 NOTE — DISCHARGE NOTE NURSING/CASE MANAGEMENT/SOCIAL WORK - PATIENT PORTAL LINK FT
You can access the FollowMyHealth Patient Portal offered by [facility] by registering at the following website: [email or web address]. By joining [facility]’s FollowMyHealth portal, you will also be able to view your health information using other applications (apps) compatible with our system.

## 2023-08-14 NOTE — DIETITIAN INITIAL EVALUATION ADULT - FUNCTIONAL SCREEN CURRENT LEVEL: SWALLOWING (IF SCORE 2 OR MORE FOR ANY ITEM, CONSULT REHAB SERVICES), MLM)
0 = swallows foods/liquids without difficulty
Benefits, risks, and possible complications of procedure explained to patient/caregiver who verbalized understanding and gave verbal consent.

## 2023-08-15 DIAGNOSIS — R07.9 CHEST PAIN, UNSPECIFIED: ICD-10-CM

## 2023-08-15 LAB
ANION GAP SERPL CALC-SCNC: 6 MMOL/L — SIGNIFICANT CHANGE UP (ref 5–17)
BUN SERPL-MCNC: 22 MG/DL — SIGNIFICANT CHANGE UP (ref 7–23)
CALCIUM SERPL-MCNC: 9.4 MG/DL — SIGNIFICANT CHANGE UP (ref 8.5–10.1)
CHLORIDE SERPL-SCNC: 102 MMOL/L — SIGNIFICANT CHANGE UP (ref 96–108)
CO2 SERPL-SCNC: 27 MMOL/L — SIGNIFICANT CHANGE UP (ref 22–31)
CREAT SERPL-MCNC: 1.32 MG/DL — HIGH (ref 0.5–1.3)
EGFR: 58 ML/MIN/1.73M2 — LOW
GLUCOSE SERPL-MCNC: 106 MG/DL — HIGH (ref 70–99)
MAGNESIUM SERPL-MCNC: 2.1 MG/DL — SIGNIFICANT CHANGE UP (ref 1.6–2.6)
POTASSIUM SERPL-MCNC: 4 MMOL/L — SIGNIFICANT CHANGE UP (ref 3.5–5.3)
POTASSIUM SERPL-SCNC: 4 MMOL/L — SIGNIFICANT CHANGE UP (ref 3.5–5.3)
SODIUM SERPL-SCNC: 135 MMOL/L — SIGNIFICANT CHANGE UP (ref 135–145)
T3 SERPL-MCNC: 70 NG/DL — LOW (ref 80–200)
T4 AB SER-ACNC: 11.5 UG/DL — SIGNIFICANT CHANGE UP (ref 4.6–12)
T4 FREE SERPL-MCNC: 1.95 NG/DL — HIGH (ref 0.76–1.46)
TROPONIN I, HIGH SENSITIVITY RESULT: 14.27 NG/L — SIGNIFICANT CHANGE UP
TSH SERPL-MCNC: 0.05 UU/ML — LOW (ref 0.34–4.82)

## 2023-08-15 PROCEDURE — 80076 HEPATIC FUNCTION PANEL: CPT

## 2023-08-15 PROCEDURE — 36415 COLL VENOUS BLD VENIPUNCTURE: CPT

## 2023-08-15 PROCEDURE — 83880 ASSAY OF NATRIURETIC PEPTIDE: CPT

## 2023-08-15 PROCEDURE — 93010 ELECTROCARDIOGRAM REPORT: CPT

## 2023-08-15 PROCEDURE — 86800 THYROGLOBULIN ANTIBODY: CPT

## 2023-08-15 PROCEDURE — C1769: CPT

## 2023-08-15 PROCEDURE — 93454 CORONARY ARTERY ANGIO S&I: CPT

## 2023-08-15 PROCEDURE — A9500: CPT

## 2023-08-15 PROCEDURE — 93018 CV STRESS TEST I&R ONLY: CPT

## 2023-08-15 PROCEDURE — 84145 PROCALCITONIN (PCT): CPT

## 2023-08-15 PROCEDURE — 71275 CT ANGIOGRAPHY CHEST: CPT | Mod: 26,MA

## 2023-08-15 PROCEDURE — C1887: CPT

## 2023-08-15 PROCEDURE — 84432 ASSAY OF THYROGLOBULIN: CPT

## 2023-08-15 PROCEDURE — 87040 BLOOD CULTURE FOR BACTERIA: CPT

## 2023-08-15 PROCEDURE — 93017 CV STRESS TEST TRACING ONLY: CPT

## 2023-08-15 PROCEDURE — 93016 CV STRESS TEST SUPVJ ONLY: CPT

## 2023-08-15 PROCEDURE — 80053 COMPREHEN METABOLIC PANEL: CPT

## 2023-08-15 PROCEDURE — 86140 C-REACTIVE PROTEIN: CPT

## 2023-08-15 PROCEDURE — 83036 HEMOGLOBIN GLYCOSYLATED A1C: CPT

## 2023-08-15 PROCEDURE — 85027 COMPLETE CBC AUTOMATED: CPT

## 2023-08-15 PROCEDURE — 86376 MICROSOMAL ANTIBODY EACH: CPT

## 2023-08-15 PROCEDURE — 84480 ASSAY TRIIODOTHYRONINE (T3): CPT

## 2023-08-15 PROCEDURE — 74174 CTA ABD&PLVS W/CONTRAST: CPT | Mod: 26,MA

## 2023-08-15 PROCEDURE — 83735 ASSAY OF MAGNESIUM: CPT

## 2023-08-15 PROCEDURE — 85025 COMPLETE CBC W/AUTO DIFF WBC: CPT

## 2023-08-15 PROCEDURE — 80048 BASIC METABOLIC PNL TOTAL CA: CPT

## 2023-08-15 PROCEDURE — 99222 1ST HOSP IP/OBS MODERATE 55: CPT

## 2023-08-15 PROCEDURE — 12345: CPT | Mod: NC

## 2023-08-15 PROCEDURE — 84436 ASSAY OF TOTAL THYROXINE: CPT

## 2023-08-15 PROCEDURE — 78452 HT MUSCLE IMAGE SPECT MULT: CPT

## 2023-08-15 PROCEDURE — 83605 ASSAY OF LACTIC ACID: CPT

## 2023-08-15 PROCEDURE — 84100 ASSAY OF PHOSPHORUS: CPT

## 2023-08-15 PROCEDURE — C1894: CPT

## 2023-08-15 PROCEDURE — 93005 ELECTROCARDIOGRAM TRACING: CPT

## 2023-08-15 PROCEDURE — 84484 ASSAY OF TROPONIN QUANT: CPT

## 2023-08-15 PROCEDURE — 78452 HT MUSCLE IMAGE SPECT MULT: CPT | Mod: 26

## 2023-08-15 PROCEDURE — 84439 ASSAY OF FREE THYROXINE: CPT

## 2023-08-15 PROCEDURE — 82306 VITAMIN D 25 HYDROXY: CPT

## 2023-08-15 PROCEDURE — 80061 LIPID PANEL: CPT

## 2023-08-15 PROCEDURE — 84481 FREE ASSAY (FT-3): CPT

## 2023-08-15 PROCEDURE — 84443 ASSAY THYROID STIM HORMONE: CPT

## 2023-08-15 RX ORDER — ONDANSETRON 8 MG/1
4 TABLET, FILM COATED ORAL EVERY 6 HOURS
Refills: 0 | Status: DISCONTINUED | OUTPATIENT
Start: 2023-08-15 | End: 2023-08-17

## 2023-08-15 RX ORDER — METOPROLOL TARTRATE 50 MG
12.5 TABLET ORAL DAILY
Refills: 0 | Status: DISCONTINUED | OUTPATIENT
Start: 2023-08-15 | End: 2023-08-15

## 2023-08-15 RX ORDER — TADALAFIL 10 MG/1
2 TABLET, FILM COATED ORAL
Refills: 0 | DISCHARGE

## 2023-08-15 RX ORDER — ACETAMINOPHEN 500 MG
650 TABLET ORAL EVERY 6 HOURS
Refills: 0 | Status: DISCONTINUED | OUTPATIENT
Start: 2023-08-15 | End: 2023-08-17

## 2023-08-15 RX ORDER — LISDEXAMFETAMINE DIMESYLATE 70 MG/1
1 CAPSULE ORAL
Refills: 0 | DISCHARGE

## 2023-08-15 RX ORDER — HYDRALAZINE HCL 50 MG
10 TABLET ORAL EVERY 6 HOURS
Refills: 0 | Status: DISCONTINUED | OUTPATIENT
Start: 2023-08-15 | End: 2023-08-16

## 2023-08-15 RX ORDER — AMLODIPINE BESYLATE 2.5 MG/1
10 TABLET ORAL DAILY
Refills: 0 | Status: DISCONTINUED | OUTPATIENT
Start: 2023-08-15 | End: 2023-08-16

## 2023-08-15 RX ORDER — AMLODIPINE BESYLATE 2.5 MG/1
5 TABLET ORAL DAILY
Refills: 0 | Status: DISCONTINUED | OUTPATIENT
Start: 2023-08-15 | End: 2023-08-15

## 2023-08-15 RX ORDER — LANOLIN ALCOHOL/MO/W.PET/CERES
3 CREAM (GRAM) TOPICAL AT BEDTIME
Refills: 0 | Status: DISCONTINUED | OUTPATIENT
Start: 2023-08-15 | End: 2023-08-17

## 2023-08-15 RX ORDER — REGADENOSON 0.08 MG/ML
0.4 INJECTION, SOLUTION INTRAVENOUS ONCE
Refills: 0 | Status: COMPLETED | OUTPATIENT
Start: 2023-08-15 | End: 2023-08-15

## 2023-08-15 RX ORDER — ASPIRIN/CALCIUM CARB/MAGNESIUM 324 MG
324 TABLET ORAL ONCE
Refills: 0 | Status: COMPLETED | OUTPATIENT
Start: 2023-08-15 | End: 2023-08-15

## 2023-08-15 RX ORDER — HYDRALAZINE HCL 50 MG
10 TABLET ORAL EVERY 6 HOURS
Refills: 0 | Status: DISCONTINUED | OUTPATIENT
Start: 2023-08-15 | End: 2023-08-15

## 2023-08-15 RX ORDER — TADALAFIL 10 MG/1
1 TABLET, FILM COATED ORAL
Refills: 0 | DISCHARGE

## 2023-08-15 RX ORDER — LOSARTAN POTASSIUM 100 MG/1
25 TABLET, FILM COATED ORAL DAILY
Refills: 0 | Status: DISCONTINUED | OUTPATIENT
Start: 2023-08-15 | End: 2023-08-15

## 2023-08-15 RX ORDER — HYDRALAZINE HCL 50 MG
25 TABLET ORAL EVERY 8 HOURS
Refills: 0 | Status: DISCONTINUED | OUTPATIENT
Start: 2023-08-15 | End: 2023-08-15

## 2023-08-15 RX ORDER — PANTOPRAZOLE SODIUM 20 MG/1
40 TABLET, DELAYED RELEASE ORAL
Refills: 0 | Status: DISCONTINUED | OUTPATIENT
Start: 2023-08-15 | End: 2023-08-17

## 2023-08-15 RX ADMIN — AMLODIPINE BESYLATE 10 MILLIGRAM(S): 2.5 TABLET ORAL at 08:35

## 2023-08-15 RX ADMIN — Medication 30 MILLILITER(S): at 07:03

## 2023-08-15 RX ADMIN — Medication 324 MILLIGRAM(S): at 02:47

## 2023-08-15 RX ADMIN — Medication 25 MILLIGRAM(S): at 20:08

## 2023-08-15 RX ADMIN — Medication 10 MILLIGRAM(S): at 06:30

## 2023-08-15 RX ADMIN — LOSARTAN POTASSIUM 25 MILLIGRAM(S): 100 TABLET, FILM COATED ORAL at 08:35

## 2023-08-15 RX ADMIN — PANTOPRAZOLE SODIUM 40 MILLIGRAM(S): 20 TABLET, DELAYED RELEASE ORAL at 06:29

## 2023-08-15 RX ADMIN — REGADENOSON 0.4 MILLIGRAM(S): 0.08 INJECTION, SOLUTION INTRAVENOUS at 12:25

## 2023-08-15 RX ADMIN — Medication 30 MILLILITER(S): at 18:58

## 2023-08-15 NOTE — CONSULT NOTE ADULT - SUBJECTIVE AND OBJECTIVE BOX
CHIEF COMPLAINT: Patient is a 68y old  Male who presents with a chief complaint of chest pain (13 Aug 2023 13:58)    FROM H&P: 68 year old male w BPH,  esophageal CA in remission, COPD,  coronary artery calcification, DDD,    thyromegaly, tracheal stenosis returned to  ED c/o constant mid sternal chest pain for the last 15-20 minutes prior to this ED presentation    Had been admitted to hospitalist service and discharged  trop neg x 3  echo nl LVEF 60 %  seen by cardiology w scheduled follow up for new onset HTN  Also seen by GI who recommended adding omeprazole to cover for possibility of reflux  since pt had initially had nausea and chest pain    Went home and then pain began again and worsened.  BP at home was 199/105  +constant chest pain.  no SOB, cough, or nausea. has been feeling cold sweats.   CP radiated to back earlier. + light tingling in fingers.  felt that amlodipine improved symptoms  also given nitropaste for relief.      8/15. Intermittent chest pressure. Adjust BP meds. plan for NMST today.      PREVIOUS DIAGNOSTIC TESTING:    last Stress test 2019 neg, echo w/ NVLEF in June 2023    MEDICATIONS  (STANDING):  amLODIPine   Tablet 10 milliGRAM(s) Oral daily  losartan 25 milliGRAM(s) Oral daily  pantoprazole    Tablet 40 milliGRAM(s) Oral before breakfast    MEDICATIONS  (PRN):  acetaminophen     Tablet .. 650 milliGRAM(s) Oral every 6 hours PRN Mild Pain (1 - 3)  aluminum hydroxide/magnesium hydroxide/simethicone Suspension 30 milliLiter(s) Oral every 4 hours PRN Dyspepsia  hydrALAZINE 25 milliGRAM(s) Oral every 8 hours PRN for SBP >160  melatonin 3 milliGRAM(s) Oral at bedtime PRN Insomnia  nitroglycerin     SubLingual 0.4 milliGRAM(s) SubLingual every 5 minutes PRN Chest Pain  ondansetron Injectable 4 milliGRAM(s) IV Push every 6 hours PRN Nausea and/or Vomiting    Home Medications:  tadalafil 2.5 mg oral tablet: 1 tab(s) orally once a day as needed for (13 Aug 2023 13:35)  Vyvanse 70 mg oral capsule: 1 cap(s) orally as needed for (13 Aug 2023 13:35)    PHYSICAL EXAM:  T(C): 36.7 (15 Aug 2023 02:15), Max: 37.2 (14 Aug 2023 21:03)  T(F): 98 (15 Aug 2023 02:15), Max: 98.9 (14 Aug 2023 21:03)  HR: 80 (15 Aug 2023 08:47) (62 - 90)  BP: 165/84 (15 Aug 2023 08:47) (147/59 - 186/72)  BP(mean): 126 (14 Aug 2023 21:03) (126 - 126)  RR: 18 (15 Aug 2023 08:47) (18 - 18)  SpO2: 100% (15 Aug 2023 08:47) (99% - 100%)    Parameters below as of 15 Aug 2023 08:47  Patient On (Oxygen Delivery Method): room air    Constitutional: NAD, awake and alert  HEENT: PERR, EOMI, Normal Hearing, MMM  Neck: Soft and supple, No LAD, No JVD  Respiratory: Breath sounds are clear bilaterally, No wheezing, rales or rhonchi  Cardiovascular: S1 and S2, regular rate and rhythm, no Murmurs, gallops or rubs  Gastrointestinal: Bowel Sounds present, soft, nontender, nondistended, no guarding, no rebound  Extremities: No peripheral edema  Vascular: 2+ peripheral pulses  Neurological: A/O x 3, no focal deficits  Musculoskeletal: 5/5 strength b/l upper and lower extremities  Skin: No rashes    =======================================    INTERPRETATION OF TELEMETRY: SR     ECG: < from: 12 Lead ECG (08.13.23 @ 11:08) >  Normal sinus rhythm  RSR' or QR pattern in V1 suggests right ventricular conduction delay    8/15: EKG: SR, no ischemic changes  ========================================    LABS:               17.3   15.18 )-----------( 349      ( 14 Aug 2023 21:46 )             48.8     08-15    135  |  102  |  22  ----------------------------<  106<H>  4.0   |  27  |  1.32<H>    Ca    9.4      15 Aug 2023 08:29  Mg     2.1     08-15    TPro  7.1  /  Alb  3.9  /  TBili  1.1  /  DBili  x   /  AST  23  /  ALT  33  /  AlkPhos  118  08-14    PT/INR - ( 14 Aug 2023 21:46 )   PT: 10.3 sec;   INR: 0.91 ratio       PTT - ( 14 Aug 2023 21:46 )  PTT:30.5 sec    - TroponinI hsT: <-14.27, <-14.46, <-27.17, <-20.62, <-15.24    < from: TTE Echo Complete w/o Contrast w/ Doppler (08.14.23 @ 08:52) >   The left ventricle is normal in size and contractility with LVEF   estimated   at 65%. Mild diastolic dysfunction (stage I).   Mild concentric left ventricular hypertrophy.   Normal appearing right ventricle structure and function.   Mild Aortic regurgitation.   Mild tricuspid valve regurgitation.    < end of copied text >    RADIOLOGY & ADDITIONAL STUDIES:      < from: CT Angio Chest Aorta w/wo IV Cont (08.15.23 @ 00:29) >  No aortic dissection or aneurysm or hemorrhage.    < end of copied text >  < from: Xray Chest 1 View- PORTABLE-Urgent (08.14.23 @ 22:23) >  No acute pulmonary disease.    Patient had follow-up CT.    < end of copied text >

## 2023-08-15 NOTE — H&P ADULT - NSHPLABSRESULTS_GEN_ALL_CORE
INDINGS:  CHEST:  LUNGS AND LARGE AIRWAYS: Patent central airways.  2 mm left upper lobe nodule (2:29) unchanged.  4 mm nodule along the right minor fissure (2:64) unchanged.  PLEURA: No pleural effusion.  VESSELS: Within normal limits. No aortic dissection or aneurysm or   hemorrhage. Mediport tip in superior vena cava.  HEART: Heart size is normal. No pericardial effusion.  MEDIASTINUM AND MERY: No lymphadenopathy.  CHEST WALL AND LOWER NECK: Substernal thyroid goiter. This would be   better evaluated with nonemergent thyroid ultrasound.    ABDOMEN AND PELVIS:  LIVER: Within normal limits.  BILE DUCTS: Normal caliber.  GALLBLADDER: Within normal limits.  SPLEEN: Within normal limits.  PANCREAS: Within normal limits.  ADRENALS: Within normal limits.  KIDNEYS/URETERS: Within normal limits.    BLADDER: Within normal limits.  REPRODUCTIVE ORGANS: Prostate is enlarged.    BOWEL: Esophagectomy. Gastric pull-through. No bowel obstruction.   Appendix is not visualized. No evidence of inflammation in the pericecal   region.  PERITONEUM: No ascites.  VESSELS: Atherosclerotic changes of the distal aorta. No aortic   dissection or aneurysm or hemorrhage.  RETROPERITONEUM/LYMPH NODES: No lymphadenopathy.  ABDOMINAL WALL: Within normal limits.  BONES: Degenerative changes.    IMPRESSION:    No aortic dissection or aneurysm or hemorrhage.

## 2023-08-15 NOTE — CHART NOTE - NSCHARTNOTEFT_GEN_A_CORE
STRESS TEST REPORT    DIANA MARY 68yM  MRN: [id number]  : 51  Admit: 08-15-23    REGADENASON    Regadenoson was 5 cc (0.4 mg Regadenoson) which was given rapidly over 10 seconds  into a peripheral IV.  Immediately after Regadenoson injection, flush w/ 5 cc saline given.  Radiopharmaceutical was injected 10-20 sec after the saline flush.  Patient was monitored for 6 minutes.  A 12 lead ECG was recorded every minute & BP was recorded throughout the test.      Resting ECG: NSR  Peak infusion ECG: No significant EKG changes  Chest pain: None    CONCLUSION:  Normal response to IV lexiscan.  See myocardial perfusion scan report.

## 2023-08-15 NOTE — PROGRESS NOTE ADULT - SUBJECTIVE AND OBJECTIVE BOX
Subjective:  Chief complain :   Patient is a 68y old  Male who presents with a chief complaint of HTN urgency (15 Aug 2023 10:31)    HPI:  68 year old male w BPH,  esophageal CA in remission, COPD,  coronary artery calcification, DDD,    thyromegaly, tracheal stenosis returned to  ED c/o constant mid sternal chest pain for the last 15-20 minutes prior to this ED presentation    Had been admitted to hospitalist service and discharged  trop neg x 3  echo nl LVEF 60 %  seen by cardiology w scheduled follow up for new onset HTN  Also seen by GI who recommended adding omeprazole to cover for possibility of reflux  since pt had initially had nausea and chest pain    Went home and then pain began again and worsened.  BP at home was 199/105  +constant chest pain.  no SOB, cough, or nausea. has been feeling cold sweats.   CP radiated to back earlier. + light tingling in fingers.  felt that amlodipine improved symptoms  also given nitropaste for relief.       In ED /109   HR 80    RR 18   T 98.9   100% sat RA  fentanyl 25  famotidine 20 mg  NTP  carafate  repeat CTA no dissection        PAST MEDICAL HX  Benign prostatic hyperplasia with obstruction/lower urinary tract symptoms  COPD with emphysema   Coronary artery calcification   DDD (degenerative disc disease), lumbar   Emphysema lung   Esophageal cancer CA last chemo   Esophageal dilatation   Inguinal hernia   Lumbar degenerative disc disease   Multiple lung nodules  Right arm fracture   Sciatica   Testosterone deficiency   Thyromegaly   Tracheal deviation   Tracheal stenosis   Urinary hesitancy   Urinary urgency       PAST SURGICAL HX  Esophagectomy with gastric pull-through  Inguinal hernia repair 2016  S/P tonsillectomy 5y/o  Varicocele right 1981      FAMILY HX  Family history of lung cancer   Family history of malignant neoplasm of breast  Denied: Family history of malignant neoplasm of prostate : Father    SOCIAL HX    Former smoker        (15 Aug 2023 04:29)         Patient seen and examined at bedside earlier today,     Review of system- Rest of the review of system are negative except mentioned in HPI     Vital sings reviewed for last 24 h  T(C): 36.7 (08-15-23 @ 16:51), Max: 37.2 (08-14-23 @ 21:03)  HR: 67 (08-15-23 @ 16:51) (62 - 90)  BP: 108/66 (08-15-23 @ 16:51) (108/66 - 186/72)  RR: 18 (08-15-23 @ 16:51) (18 - 18)  SpO2: 99% (08-15-23 @ 16:51) (99% - 100%)  Wt(kg): --  Daily Height in cm: 180.34 (14 Aug 2023 20:56)    Daily   CAPILLARY BLOOD GLUCOSE          Physical exam:   General : NAD, appear to be of stated age , well groomed   NERVOUS SYSTEM:  Alert & Oriented X3, non- focal exam, Motor Strength 5/5 B/L upper and lower extremities; DTRs 2+ intact and symmetric  HEAD:  Atraumatic, Normocephalic  EYES: EOMI, PERRLA, conjunctiva and sclera clear  HEENT: Moist mucous membranes, Supple neck , No JVD  CHEST: Clear to auscultation bilaterally; No rales, no rhonchi, no wheezing  HEART: Regular rate and rhythm; No murmurs, no rubs or gallops  ABDOMEN: Soft, Non-tender, Non-distended; Bowel sounds present, no guarding , no peritoneal irritation   GENITOURINARY- Voiding, no suprapubic tenderness  EXTREMITIES:  2+ Peripheral Pulses, No clubbing, cyanosis,   edema  MUSCULOSKELETAL:- No muscle tenderness, Muscle tone normal, No joint tenderness, no Joint swelling,  Joint ROM –normal   SKIN-no rash, no lesion    Labs radiologic and other test : all reviewed and interpreted :                         17.3   15.18 )-----------( 349      ( 14 Aug 2023 21:46 )             48.8     08-15    135  |  102  |  22  ----------------------------<  106<H>  4.0   |  27  |  1.32<H>    Ca    9.4      15 Aug 2023 08:29  Mg     2.1     08-15    TPro  7.1  /  Alb  3.9  /  TBili  1.1  /  DBili  x   /  AST  23  /  ALT  33  /  AlkPhos  118  08-14    PT/INR - ( 14 Aug 2023 21:46 )   PT: 10.3 sec;   INR: 0.91 ratio         PTT - ( 14 Aug 2023 21:46 )  PTT:30.5 sec        Urinalysis Basic - ( 15 Aug 2023 08:29 )    Color: x / Appearance: x / SG: x / pH: x  Gluc: 106 mg/dL / Ketone: x  / Bili: x / Urobili: x   Blood: x / Protein: x / Nitrite: x   Leuk Esterase: x / RBC: x / WBC x   Sq Epi: x / Non Sq Epi: x / Bacteria: x          RECENT CULTURES:      Cardiac testing : reviewed   EKG     Procedures :     Devices:     Current medications:  acetaminophen     Tablet .. 650 milliGRAM(s) Oral every 6 hours PRN  aluminum hydroxide/magnesium hydroxide/simethicone Suspension 30 milliLiter(s) Oral every 4 hours PRN  amLODIPine   Tablet 10 milliGRAM(s) Oral daily  hydrALAZINE 25 milliGRAM(s) Oral every 8 hours PRN  losartan 25 milliGRAM(s) Oral daily  melatonin 3 milliGRAM(s) Oral at bedtime PRN  nitroglycerin     SubLingual 0.4 milliGRAM(s) SubLingual every 5 minutes PRN  ondansetron Injectable 4 milliGRAM(s) IV Push every 6 hours PRN  pantoprazole    Tablet 40 milliGRAM(s) Oral before breakfast             Subjective:  Chief complain : chest pain , high BP , palpitations     HPI:  69 y/o with PMH of  Esophageal Ca s/p Esophagectomy with gastric pull-through, thyroid nodules  with thyromegaly with hyperfunctioning thyroid , tracheal stenosis and deviation,  COPD,  multiple lung nodules,  coronary artery calcification, DDD, BPH   thyromegaly present to ED with  c/o constant mid sternal chest pain for the last 15-20 minutes prior to this ED presentation . Had been admitted to hospitalist service and discharged , trop neg x 3, echo nl LVEF 60 %, seen by cardiology w scheduled follow up for new onset HTN. Also seen by GI who recommended adding omeprazole to cover for possibility of reflux since pt had initially had nausea and chest pain. Pt reports uncontrolled BP at home  199/105, constant pressure like  chest pain. +  cold sweats.  CP radiated to back earlier. + light tingling in fingers. felt that amlodipine improved symptoms also given nitropaste for relief.  In ED /109   HR 80    RR 18   T 98.9   100% sat RA, fentanyl 25, famotidine 20 mg, NTP, carafate, repeat CTA no dissection    8/15-   Patient seen and examined at bedside earlier today, s/p NST , awaiting results, pt feeling better, BP better controlled, denies cp, abdominal pain, sore throat, pain, afebrile, reports episodes of palpitations sweating and chest pain when BP elevated    Review of system- Rest of the review of system are negative except mentioned in HPI     Vital sings reviewed for last 24 h  T(C): 36.7 (08-15-23 @ 16:51), Max: 37.2 (08-14-23 @ 21:03)  HR: 67 (08-15-23 @ 16:51) (62 - 90)  BP: 108/66 (08-15-23 @ 16:51) (108/66 - 186/72)  RR: 18 (08-15-23 @ 16:51) (18 - 18)  SpO2: 99% (08-15-23 @ 16:51) (99% - 100%)    Physical exam:   General : NAD, appear to be of stated age   NERVOUS SYSTEM:  Alert & Oriented X3, non- focal exam, Motor Strength 5/5 B/L upper and lower extremities; DTRs 2+ intact and symmetric  HEAD:  Atraumatic, Normocephalic  EYES: EOMI, PERRLA, conjunctiva and sclera clear  HEENT: Moist mucous membranes, Supple neck , No JVD  CHEST: Clear to auscultation bilaterally; No rales, no rhonchi, no wheezing  HEART: Regular rate and rhythm; No murmurs, no rubs or gallops  ABDOMEN: Soft, Non-tender, Non-distended; Bowel sounds present, no guarding , no peritoneal irritation   GENITOURINARY- Voiding, no suprapubic tenderness  EXTREMITIES:  2+ Peripheral Pulses, No clubbing, cyanosis,   edema  MUSCULOSKELETAL:- No muscle tenderness, Muscle tone normal, No joint tenderness, no Joint swelling,  Joint ROM –normal   SKIN-no rash, no lesion    Labs radiologic and other test : all reviewed and interpreted :                         17.3   15.18 )-----------( 349      ( 14 Aug 2023 21:46 )             48.8     08-15    135  |  102  |  22  ----------------------------<  106<H>  4.0   |  27  |  1.32<H>    Ca    9.4      15 Aug 2023 08:29  Mg     2.1     08-15    TPro  7.1  /  Alb  3.9  /  TBili  1.1  /  DBili  x   /  AST  23  /  ALT  33  /  AlkPhos  118  08-14    PT/INR - ( 14 Aug 2023 21:46 )   PT: 10.3 sec;   INR: 0.91 ratio         PTT - ( 14 Aug 2023 21:46 )  PTT:30.5 sec    Thyroid Stimulating Hormone, Serum: 0.05 uU/mL (08.15.23 @ 08:29)    Free Thyroxine, Serum: 1.95 ng/dL (08.15.23 @ 16:27)      NM Nuclear Stress Pharmacologic Multiple (08.15.23 @ 13:45) >  IMPRESSION: SPECT Myocardial Perfusion Imaging demonstrates:    Transient ischemic dilatation of the left ventricle with TID ratio =1.54,   concerning for severe ischemia.    No scan evidence of reversible or fixed perfusion defects.    Normal left ventricular contractility with an ejection fraction of 55%   (Normal: 50% or greater)    Please refer to cardiac stress test report for dosage of Regadenoson   administered, EKG findings and symptoms during the procedure.    : CT Angio Chest Aorta w/wo IV Cont (08.15.23 @ 00:29) >  LUNGS AND LARGE AIRWAYS: Patent central airways.  2 mm left upper lobe nodule (2:29) unchanged.  4 mm nodule along the right minor fissure (2:64) unchanged.  PLEURA: No pleural effusion.  VESSELS: Within normal limits. No aortic dissection or aneurysm or   hemorrhage. Mediport tip in superior vena cava.  HEART: Heart size is normal. No pericardial effusion.  MEDIASTINUM AND MERY: No lymphadenopathy.  CHEST WALL AND LOWER NECK: Substernal thyroid goiter. This would be   better evaluated with nonemergent thyroid ultrasound.    ABDOMEN AND PELVIS:  LIVER: Within normal limits.  BILE DUCTS:Normal caliber.  GALLBLADDER: Within normal limits.  SPLEEN: Within normal limits.  PANCREAS: Within normal limits.  ADRENALS: Within normal limits.  KIDNEYS/URETERS: Within normal limits.    BLADDER: Within normal limits.  REPRODUCTIVE ORGANS: Prostate is enlarged.    BOWEL: Esophagectomy. Gastric pull-through. No bowel obstruction.   Appendix is not visualized. No evidence of inflammation in the pericecal   region.  PERITONEUM: No ascites.  VESSELS: Atherosclerotic changes of the distal aorta. No aortic   dissection or aneurysm or hemorrhage.  RETROPERITONEUM/LYMPH NODES: No lymphadenopathy.  ABDOMINAL WALL: Within normal limits.  BONES: Degenerative changes.    IMPRESSION:    No aortic dissection or aneurysm or hemorrhage.    < from: Xray Chest 1 View- PORTABLE-Urgent (08.14.23 @ 22:23) >  Impression:    No acute pulmonary disease.    Patient had follow-up CT.    < end of copied text >          RECENT CULTURES:      Cardiac testing : reviewed   EKG    12 Lead ECG (08.15.23 @ 07:52) >  Ventricular Rate 71 BPM   Normal sinus rhythm  Right ventricular conduction delay  When compared with ECG of 14-AUG-2023 20:56,  No significant change was found     12 Lead ECG (08.13.23 @ 10:06) >  Ventricular Rate 63 BPM   Normal sinus rhythm  Right bundle branch block  Abnormal ECG      - TroponinI hsT: <-14.27, <-14.46, <-27.17, <-20.62, <-15.24     TTE Echo Complete w/o Contrast w/ Doppler (08.14.23 @ 08:52) >   The left ventricle is normal in size and contractility with LVEF   estimated   at 65%. Mild diastolic dysfunction (stage I).   Mild concentric left ventricular hypertrophy.   Normal appearing right ventricle structure and function.   Mildaortic regurgitation.   Mild tricuspid valve regurgitation.        Procedures :     Devices: Biosceptre     MEDICATIONS  (STANDING):  amLODIPine   Tablet 10 milliGRAM(s) Oral daily  pantoprazole    Tablet 40 milliGRAM(s) Oral before breakfast  propranolol 10 milliGRAM(s) Oral every 12 hours    MEDICATIONS  (PRN):  acetaminophen     Tablet .. 650 milliGRAM(s) Oral every 6 hours PRN Mild Pain (1 - 3)  aluminum hydroxide/magnesium hydroxide/simethicone Suspension 30 milliLiter(s) Oral every 4 hours PRN Dyspepsia  hydrALAZINE 10 milliGRAM(s) Oral every 6 hours PRN for SBP >140  melatonin 3 milliGRAM(s) Oral at bedtime PRN Insomnia  nitroglycerin     SubLingual 0.4 milliGRAM(s) SubLingual every 5 minutes PRN Chest Pain  ondansetron Injectable 4 milliGRAM(s) IV Push every 6 hours PRN Nausea and/or Vomiting

## 2023-08-15 NOTE — PROGRESS NOTE ADULT - ASSESSMENT
68 year old male w BPH,  esophageal CA in remission, COPD,  coronary artery calcification, DDD,    thyromegaly, tracheal stenosis returned to  ED c/o constant mid sternal chest pain for the last 15-20 minutes prior to this ED presentation      #HTN urgency  /105 at home, ranges here as above  #Recurrent chest pain  CTA ruled out aortic dissection since pain radiated to back  3 prior trop neg, repeat neg x 1  prior CTA ruled out PE  1. place on observation : telemetry  2. continue norvasc 5 mg po qd  3. monitor BP  4. call cardiology to discuss follow up in AM as he was to see them in office  5. check trop in AM  6. monitor Cr joyce after dye studies      #Esophageal CA in remission  s/p esophagectomy w gastric pull through  chemo   denied heartburn  1. pantoprazole 40 mg in AM  2. s/p famotidine in ED  3. CCB above should promote relaxation of GI smooth mm        #BPH  not on meds now        #MISC  held Vyvanse        #55 minutes     67 y/o with PMH of  Esophageal Ca s/p Esophagectomy with gastric pull-through, thyroid nodules  with thyromegaly with hyperfunctioning thyroid , tracheal stenosis and deviation,  COPD,  multiple lung nodules,  coronary artery calcification, DDD, BPH   thyromegaly present to ED with  c/o constant mid sternal chest pain for the last 15-20 minutes prior to this ED presentation . Had been admitted to hospitalist service and discharged , trop neg x 3, echo nl LVEF 60 %, seen by cardiology w scheduled follow up for new onset HTN. Also seen by GI who recommended adding omeprazole to cover for possibility of reflux since pt had initially had nausea and chest pain. Pt reports uncontrolled BP at home  199/105, constant pressure like  chest pain. +  cold sweats.  CP radiated to back earlier. + light tingling in fingers. felt that amlodipine improved symptoms also given nitropaste for relief.  In ED /109   HR 80    RR 18   T 98.9   100% sat RA, fentanyl 25, famotidine 20 mg, NTP, carafate, repeat CTA no dissection    Chest pain ruled out ACS due to HTN urgency  - monitor BP,  /105 at home  - troponin x3 neg , check A1C. lipid profile, TSH noted   - CTA ruled out aortic dissection  s/p IV contrast   - NST - pending cardiology team recommendations  - continue  norvasc  10 mg po qd  - stop losartan has CKD s/p IV contrast exposure  - add propranolol 10 bid   - cardiology consult     Recent Dx of hyperthyroidism   Known thyroid nodules , thyromegaly   - low TSH, high free T4   - start propranolol  - Propranolol is the preferred agent for ß-blockade in hyperthyroidism and thyroid storm due to its additional effect of blocking the peripheral conversion of inactive T4 to active form T3.    Esophageal CA in remission  - s/p esophagectomy w gastric pull through  - chemo   - c/w  pantoprazole   -  CCB above should promote relaxation of GI smooth mm  - GI consult - planning for EGD once cleared by cardiology     BPH , Testosterone deficiency   - on monthly testosterone inj   - can increase risk of CV diseases , stroke  - check lipid profile        Home Medications:  ciprofloxacin 500 mg oral tablet: 1 tab(s) orally 2 times a day x 14 days ****Course Complete*** (15 Aug 2023 08:32)  tadalafil 2.5 mg oral tablet: 2 tab(s) orally once a day as needed for BPH (15 Aug 2023 08:30)  Testosterone Cypionate 200 mg/mL intramuscular solution: 1.5 milliliter(s) intramuscularly once a month ***pt missed last dose*** (15 Aug 2023 08:31)  Vyvanse 70 mg oral capsule: 1 cap(s) orally once a day (15 Aug 2023 08:31)    Dispo - cardiology and GI evaluation , tele monitoring    69 y/o with PMH of  Esophageal Ca s/p Esophagectomy with gastric pull-through, thyroid nodules  with thyromegaly with hyperfunctioning thyroid , tracheal stenosis and deviation,  COPD,  multiple lung nodules,  coronary artery calcification, DDD, BPH   thyromegaly present to ED with  c/o constant mid sternal chest pain for the last 15-20 minutes prior to this ED presentation . Had been admitted to hospitalist service and discharged , trop neg x 3, echo nl LVEF 60 %, seen by cardiology w scheduled follow up for new onset HTN. Also seen by GI who recommended adding omeprazole to cover for possibility of reflux since pt had initially had nausea and chest pain. Pt reports uncontrolled BP at home  199/105, constant pressure like  chest pain. +  cold sweats.  CP radiated to back earlier. + light tingling in fingers. felt that amlodipine improved symptoms also given nitropaste for relief.  In ED /109   HR 80    RR 18   T 98.9   100% sat RA, fentanyl 25, famotidine 20 mg, NTP, carafate, repeat CTA no dissection    Chest pain ruled out ACS due to HTN urgency  - monitor BP,  /105 at home  - troponin x3 neg , check A1C. lipid profile, TSH noted   - CTA ruled out aortic dissection  s/p IV contrast   - NST - pending cardiology team recommendations  - continue  norvasc  10 mg po qd  - stop losartan has CKD s/p IV contrast exposure  - add propranolol 10 bid   - cardiology consult     Recent Dx of hyperthyroidism   Known thyroid nodules , thyromegaly   - low TSH, high free T4   - start propranolol  - Propranolol is the preferred agent for ß-blockade in hyperthyroidism and thyroid storm due to its additional effect of blocking the peripheral conversion of inactive T4 to active form T3.    Esophageal CA in remission  - s/p esophagectomy w gastric pull through  - chemo   - c/w  pantoprazole   -  CCB above should promote relaxation of GI smooth mm  - GI consult - planning for EGD once cleared by cardiology     BPH , Testosterone deficiency   - on monthly testosterone inj   - can increase risk of CV diseases , stroke  - check lipid profile     Leukocytosis  - urine cx - neg 8/13   - check blood cx x2, lactate, procalcitonin       Home Medications:  ciprofloxacin 500 mg oral tablet: 1 tab(s) orally 2 times a day x 14 days ****Course Complete*** (15 Aug 2023 08:32)  tadalafil 2.5 mg oral tablet: 2 tab(s) orally once a day as needed for BPH (15 Aug 2023 08:30)  Testosterone Cypionate 200 mg/mL intramuscular solution: 1.5 milliliter(s) intramuscularly once a month ***pt missed last dose*** (15 Aug 2023 08:31)  Vyvanse 70 mg oral capsule: 1 cap(s) orally once a day (15 Aug 2023 08:31)    Dispo - cardiology and GI evaluation , tele monitoring

## 2023-08-15 NOTE — CONSULT NOTE ADULT - NS_MD_PANP_GEN_ALL_CORE
Attending and PA/NP shared services statement (NON-critical care): pt reports lives in an apt. has 24 steps/alone

## 2023-08-15 NOTE — H&P ADULT - NSHPPHYSICALEXAM_GEN_ALL_CORE
Vital Signs Last 24 Hrs  T(C): 36.7 (15 Aug 2023 02:15), Max: 37.2 (14 Aug 2023 21:03)  T(F): 98 (15 Aug 2023 02:15), Max: 98.9 (14 Aug 2023 21:03)  HR: 90 (15 Aug 2023 02:15) (64 - 90)  BP: 157/72 (15 Aug 2023 02:15) (111/57 - 186/72)  BP(mean): 126 (14 Aug 2023 21:03) (126 - 126)  RR: 18 (15 Aug 2023 02:15) (18 - 19)  SpO2: 99% (15 Aug 2023 02:15) (96% - 100%)    Parameters below as of 15 Aug 2023 02:15  Patient On (Oxygen Delivery Method): room air

## 2023-08-15 NOTE — CHART NOTE - NSCHARTNOTEFT_GEN_A_CORE
Patient in nuclear stress upon evaluation. Will plan on EGD if cardiac etiology chest pain ruled out. For now, IV PPI BID. Patient in nuclear stress upon evaluation today.   Formally consulted yesterday 8/14 during hospitalization and evaluated. Asymptomatic on evaluation and exam 8/14.  Was dc'ed to home yesterday. Presented to [facility] overnight with episode acute chest pain with hypertension. CE negative thus far. Patient having nuclear stress test now.    Will plan on EGD if cardiac etiology of chest pain ruled out.   For now, IV PPI BID.  Appreciate cardiology eval and recs. Patient in nuclear stress upon evaluation today.   Formally consulted yesterday 8/14 during hospitalization and evaluated. Asymptomatic on evaluation and exam 8/14.  Was FL'ed to home yesterday. Presented to [facility] overnight with episode acute chest pain with hypertension. CE negative thus far. Patient having nuclear stress test now.    Will plan on EGD if cardiac etiology of chest pain ruled out.   For now, IV PPI BID.  Appreciate cardiology eval and recs.      Attending addendum:   Patient with stress test, plan for catheterization. I discussed the case extensively with the cardiology team/Dr. Hodge. As I was planning an EGD if his cardiac work up was completely negative (ie. for non-cardiac chest pain), and he has defect enough to warrant a cath, will not pursue endoscopy at this point. Ok to proceed with cath any time. PPI daily.

## 2023-08-15 NOTE — H&P ADULT - ASSESSMENT
68 year old male w BPH,  esophageal CA in remission, COPD,  coronary artery calcification, DDD,    thyromegaly, tracheal stenosis returned to  ED c/o constant mid sternal chest pain for the last 15-20 minutes prior to this ED presentation      #HTN urgency  /105 at home, ranges here as above  #Recurrent chest pain  CTA ruled out aortic dissection since pain radiated to back  3 prior trop neg, repeat neg x 1  prior CTA ruled out PE  1. place on observation : telemetry  2. continue norvasc 5 mg po qd  3. monitor BP  4. call cardiology to discuss follow up in AM as he was to see them in office  5. check trop in AM      #Esophageal CA in remission  s/p esophagectomy w gastric pull through  chemo   denied heartburn  1. pantoprazole 40 mg in AM  2. s/p famotidine in ED  3. CCB above should promote relaxation of GI smooth mm          #BPH  not on meds now      #MISC  held Vyvanse        #55 minutes     68 year old male w BPH,  esophageal CA in remission, COPD,  coronary artery calcification, DDD,    thyromegaly, tracheal stenosis returned to  ED c/o constant mid sternal chest pain for the last 15-20 minutes prior to this ED presentation      #HTN urgency  /105 at home, ranges here as above  #Recurrent chest pain  CTA ruled out aortic dissection since pain radiated to back  3 prior trop neg, repeat neg x 1  prior CTA ruled out PE  1. place on observation : telemetry  2. continue norvasc 5 mg po qd  3. monitor BP  4. call cardiology to discuss follow up in AM as he was to see them in office  5. check trop in AM  6. monitor Cr joyce after dye studies      #Esophageal CA in remission  s/p esophagectomy w gastric pull through  chemo   denied heartburn  1. pantoprazole 40 mg in AM  2. s/p famotidine in ED  3. CCB above should promote relaxation of GI smooth mm        #BPH  not on meds now        #MISC  held Vyvanse        #55 minutes

## 2023-08-15 NOTE — H&P ADULT - SKIN/BREAST
normal... Well appearing, well nourished, awake, alert, oriented to person, place, time/situation and in no apparent distress. negative

## 2023-08-15 NOTE — CONSULT NOTE ADULT - ASSESSMENT
68 year old male w BPH,  esophageal CA in remission, COPD,  coronary artery calcification, DDD,   thyromegaly, tracheal stenosis returned to  ED c/o constant mid sternal chest pain.    #Chest pain. r/o ACS  #HTN urgency    Monitor on tele. SR, no events thus far  Troponin remain negative, repeat EKG without ischemic changes  Echo 8/14 EF 65%, mild TR  Repeat CT Chest/ Abd/ Pelvis - no aortic dissection, no pulmonary pathology. Noted with coronary arthrosclerosis c/w asa daily.   Plan for NMST today  Continue with Norvasc 10mg and Losartan added 25mg daily  GI f/u appreciated - possible scope pending cardiac workup      Case d/w

## 2023-08-15 NOTE — H&P ADULT - HISTORY OF PRESENT ILLNESS
68 year old male w BPH, inguinal hernia, esophageal CA in remission, sciatica, emphysema, DDD,  testosterone deficiency, thyromegaly, and coronary artery calcification presents to  ED c/o constant mid sternal chest pain for the last 15-20 minutes.    Had been admitted to hospitalist service  trop neg x 3  echo nl LVEF 60 %  seen by cardiology w scheduled follow up 08-15    Also seen by GI who recommended adding omeprazole to cover for possibility of reflux      Went home and then pain began again and worsened.  BP at home was 199/105  +constant chest pain.  no SOB, cough, or nausea. has been feeling cold sweats.   CP radiated to back earlier. light tingling in fingers.  felt that amlodipine improved symptoms. was also given nitropaste for relief.  few months ago started losing weight after dx with hyperthyroid.       presents to ED c/o constant mid sternal chest pain for the last 15-20 minutes  seen in ED today for hypertension and mild chest pain.   Went home and then pain began again and worsened. w BP at home was 199/105.    adult male comes in with CP was recently discharged from hospital several hrs ago with three negative troponin;   CP resolved and cardiology consult at that time recommended outpt cardiology followup. EKG negative for acute pathology; CTA negative for PE, showed coronary atherosclerosis. here, vitals are normal with the exception of hypertension 160/109. exams unremarkable. plan repeat cardiac enzymes, pain control, blood pressure control, and reassess. if workup negative today and feels better will have cardiology appointment tomorrow at 9AM    In ED /109   HR 80    RR 18   T 98.9   100% sat RA        PAST MEDICAL HX  Benign prostatic hyperplasia   Coronary artery calcification   DDD (degenerative disc disease), lumbar   Emphysema lung   Esophageal cancer CA last chemo   Inguinal hernia   Right arm fracture   Sciatica   Testosterone deficiency   Thyromegaly   Tracheal deviation   Tracheal stenosis         PAST SURGICAL HX  Esophagectomy with gastric pull-through  H/O inguinal hernia 2016  S/P tonsillectomy 7y/o  Varicocele right 1981   68 year old male w BPH,  esophageal CA in remission, COPD,  coronary artery calcification, DDD,    thyromegaly, tracheal stenosis returned to  ED c/o constant mid sternal chest pain for the last 15-20 minutes prior to this ED presentation    Had been admitted to hospitalist service and discharged  trop neg x 3  echo nl LVEF 60 %  seen by cardiology w scheduled follow up for new onset HTN  Also seen by GI who recommended adding omeprazole to cover for possibility of reflux  since pt had initially had nausea and chest pain    Went home and then pain began again and worsened.  BP at home was 199/105  +constant chest pain.  no SOB, cough, or nausea. has been feeling cold sweats.   CP radiated to back earlier. + light tingling in fingers.  felt that amlodipine improved symptoms  also given nitropaste for relief.       In ED /109   HR 80    RR 18   T 98.9   100% sat RA  fentanyl 25  famotidine 20 mg  NTP  carafate  repeat CTA no dissection        PAST MEDICAL HX  Benign prostatic hyperplasia with obstruction/lower urinary tract symptoms  COPD with emphysema   Coronary artery calcification   DDD (degenerative disc disease), lumbar   Emphysema lung   Esophageal cancer CA last chemo   Esophageal dilatation   Inguinal hernia   Lumbar degenerative disc disease   Multiple lung nodules  Right arm fracture   Sciatica   Testosterone deficiency   Thyromegaly   Tracheal deviation   Tracheal stenosis   Urinary hesitancy   Urinary urgency       PAST SURGICAL HX  Esophagectomy with gastric pull-through  Inguinal hernia repair 2016  S/P tonsillectomy 5y/o  Varicocele right 1981      FAMILY HX  Family history of lung cancer   Family history of malignant neoplasm of breast  Denied: Family history of malignant neoplasm of prostate : Father    SOCIAL HX    Former smoker

## 2023-08-16 LAB
24R-OH-CALCIDIOL SERPL-MCNC: 25.6 NG/ML — LOW (ref 30–80)
A1C WITH ESTIMATED AVERAGE GLUCOSE RESULT: 5.5 % — SIGNIFICANT CHANGE UP (ref 4–5.6)
ALBUMIN SERPL ELPH-MCNC: 3.5 G/DL — SIGNIFICANT CHANGE UP (ref 3.3–5)
ALP SERPL-CCNC: 107 U/L — SIGNIFICANT CHANGE UP (ref 40–120)
ALT FLD-CCNC: 25 U/L — SIGNIFICANT CHANGE UP (ref 12–78)
ANION GAP SERPL CALC-SCNC: 7 MMOL/L — SIGNIFICANT CHANGE UP (ref 5–17)
AST SERPL-CCNC: 14 U/L — LOW (ref 15–37)
BILIRUB DIRECT SERPL-MCNC: 0.2 MG/DL — SIGNIFICANT CHANGE UP (ref 0–0.3)
BILIRUB INDIRECT FLD-MCNC: 1 MG/DL — SIGNIFICANT CHANGE UP (ref 0.2–1)
BILIRUB SERPL-MCNC: 1.2 MG/DL — SIGNIFICANT CHANGE UP (ref 0.2–1.2)
BUN SERPL-MCNC: 23 MG/DL — SIGNIFICANT CHANGE UP (ref 7–23)
CALCIUM SERPL-MCNC: 9.3 MG/DL — SIGNIFICANT CHANGE UP (ref 8.5–10.1)
CHLORIDE SERPL-SCNC: 106 MMOL/L — SIGNIFICANT CHANGE UP (ref 96–108)
CHOLEST SERPL-MCNC: 180 MG/DL — SIGNIFICANT CHANGE UP
CO2 SERPL-SCNC: 25 MMOL/L — SIGNIFICANT CHANGE UP (ref 22–31)
CREAT SERPL-MCNC: 1.26 MG/DL — SIGNIFICANT CHANGE UP (ref 0.5–1.3)
CRP SERPL-MCNC: <3 MG/L — SIGNIFICANT CHANGE UP
EGFR: 61 ML/MIN/1.73M2 — SIGNIFICANT CHANGE UP
ESTIMATED AVERAGE GLUCOSE: 111 MG/DL — SIGNIFICANT CHANGE UP (ref 68–114)
GLUCOSE SERPL-MCNC: 96 MG/DL — SIGNIFICANT CHANGE UP (ref 70–99)
HCT VFR BLD CALC: 47.9 % — SIGNIFICANT CHANGE UP (ref 39–50)
HDLC SERPL-MCNC: 62 MG/DL — SIGNIFICANT CHANGE UP
HGB BLD-MCNC: 16.8 G/DL — SIGNIFICANT CHANGE UP (ref 13–17)
LACTATE SERPL-SCNC: 0.9 MMOL/L — SIGNIFICANT CHANGE UP (ref 0.7–2)
LIPID PNL WITH DIRECT LDL SERPL: 103 MG/DL — HIGH
MAGNESIUM SERPL-MCNC: 2.4 MG/DL — SIGNIFICANT CHANGE UP (ref 1.6–2.6)
MCHC RBC-ENTMCNC: 29.8 PG — SIGNIFICANT CHANGE UP (ref 27–34)
MCHC RBC-ENTMCNC: 35.1 GM/DL — SIGNIFICANT CHANGE UP (ref 32–36)
MCV RBC AUTO: 85.1 FL — SIGNIFICANT CHANGE UP (ref 80–100)
NON HDL CHOLESTEROL: 118 MG/DL — SIGNIFICANT CHANGE UP
NT-PROBNP SERPL-SCNC: 106 PG/ML — SIGNIFICANT CHANGE UP (ref 0–125)
PHOSPHATE SERPL-MCNC: 3.1 MG/DL — SIGNIFICANT CHANGE UP (ref 2.5–4.5)
PLATELET # BLD AUTO: 367 K/UL — SIGNIFICANT CHANGE UP (ref 150–400)
POTASSIUM SERPL-MCNC: 4 MMOL/L — SIGNIFICANT CHANGE UP (ref 3.5–5.3)
POTASSIUM SERPL-SCNC: 4 MMOL/L — SIGNIFICANT CHANGE UP (ref 3.5–5.3)
PROCALCITONIN SERPL-MCNC: 0.07 NG/ML — SIGNIFICANT CHANGE UP (ref 0.02–0.1)
PROT SERPL-MCNC: 6.4 GM/DL — SIGNIFICANT CHANGE UP (ref 6–8.3)
RBC # BLD: 5.63 M/UL — SIGNIFICANT CHANGE UP (ref 4.2–5.8)
RBC # FLD: 13.1 % — SIGNIFICANT CHANGE UP (ref 10.3–14.5)
SODIUM SERPL-SCNC: 138 MMOL/L — SIGNIFICANT CHANGE UP (ref 135–145)
T3FREE SERPL-MCNC: 2.26 PG/ML — SIGNIFICANT CHANGE UP (ref 2–4.4)
TESTOST FREE+TOTAL PANEL SERPL-MCNC: 615 NG/DL — SIGNIFICANT CHANGE UP (ref 300–740)
THYROGLOB AB FLD IA-ACNC: <20 IU/ML — SIGNIFICANT CHANGE UP
THYROGLOB AB SERPL-ACNC: <20 IU/ML — SIGNIFICANT CHANGE UP
THYROGLOB SERPL-MCNC: 30.7 NG/ML — SIGNIFICANT CHANGE UP (ref 1.6–59.9)
THYROPEROXIDASE AB SERPL-ACNC: <10 IU/ML — SIGNIFICANT CHANGE UP
TRIGL SERPL-MCNC: 83 MG/DL — SIGNIFICANT CHANGE UP
WBC # BLD: 11.72 K/UL — HIGH (ref 3.8–10.5)
WBC # FLD AUTO: 11.72 K/UL — HIGH (ref 3.8–10.5)

## 2023-08-16 PROCEDURE — 99232 SBSQ HOSP IP/OBS MODERATE 35: CPT

## 2023-08-16 RX ORDER — ASPIRIN/CALCIUM CARB/MAGNESIUM 324 MG
81 TABLET ORAL DAILY
Refills: 0 | Status: DISCONTINUED | OUTPATIENT
Start: 2023-08-16 | End: 2023-08-17

## 2023-08-16 RX ORDER — AMLODIPINE BESYLATE 2.5 MG/1
2.5 TABLET ORAL EVERY 24 HOURS
Refills: 0 | Status: DISCONTINUED | OUTPATIENT
Start: 2023-08-17 | End: 2023-08-17

## 2023-08-16 RX ORDER — ATORVASTATIN CALCIUM 80 MG/1
20 TABLET, FILM COATED ORAL AT BEDTIME
Refills: 0 | Status: DISCONTINUED | OUTPATIENT
Start: 2023-08-16 | End: 2023-08-17

## 2023-08-16 RX ORDER — CHOLECALCIFEROL (VITAMIN D3) 125 MCG
1000 CAPSULE ORAL AT BEDTIME
Refills: 0 | Status: DISCONTINUED | OUTPATIENT
Start: 2023-08-16 | End: 2023-08-17

## 2023-08-16 RX ADMIN — AMLODIPINE BESYLATE 10 MILLIGRAM(S): 2.5 TABLET ORAL at 09:31

## 2023-08-16 RX ADMIN — PANTOPRAZOLE SODIUM 40 MILLIGRAM(S): 20 TABLET, DELAYED RELEASE ORAL at 06:12

## 2023-08-16 RX ADMIN — Medication 3 MILLIGRAM(S): at 21:27

## 2023-08-16 RX ADMIN — ATORVASTATIN CALCIUM 20 MILLIGRAM(S): 80 TABLET, FILM COATED ORAL at 21:27

## 2023-08-16 RX ADMIN — Medication 81 MILLIGRAM(S): at 12:23

## 2023-08-16 RX ADMIN — Medication 1000 UNIT(S): at 21:27

## 2023-08-16 NOTE — PROGRESS NOTE ADULT - ASSESSMENT
69 y/o with PMH of  Esophageal Ca s/p Esophagectomy with gastric pull-through, thyroid nodules  with thyromegaly with hyperfunctioning thyroid , tracheal stenosis and deviation,  COPD,  multiple lung nodules,  coronary artery calcification, DDD, BPH   thyromegaly present to ED with  c/o constant mid sternal chest pain for the last 15-20 minutes prior to this ED presentation . Had been admitted to hospitalist service and discharged , trop neg x 3, echo nl LVEF 60 %, seen by cardiology w scheduled follow up for new onset HTN. Also seen by GI who recommended adding omeprazole to cover for possibility of reflux since pt had initially had nausea and chest pain. Pt reports uncontrolled BP at home  199/105, constant pressure like  chest pain. +  cold sweats.  CP radiated to back earlier. + light tingling in fingers. felt that amlodipine improved symptoms also given nitropaste for relief.  In ED /109   HR 80    RR 18   T 98.9   100% sat RA, fentanyl 25, famotidine 20 mg, NTP, carafate, repeat CTA no dissection    Chest pain ruled out ACS due to HTN urgency  Severe ischemia on NST ? CAD   - monitor BP,  /105 at home  - troponin x3 neg , check A1C. lipid profile, TSH noted   - CTA ruled out aortic dissection  s/p IV contrast   - NST - pending cardiology team recommendations  - s/p  norvasc  10 mg po qd now with very low BP   - stop losartan has CKD s/p IV contrast exposure  - add propranolol 10 bid   - use lower dose amlodipine 2.5 if SBP >140  - started on ASA, statin  -    - cardiology consult     Recent Dx of hyperthyroidism   Known thyroid nodules , thyromegaly   - low TSH, high free T4   - c/w  propranolol  10 bid   - Propranolol is the preferred agent for ß-blockade in hyperthyroidism and thyroid storm due to its additional effect of blocking the peripheral conversion of inactive T4 to active form T3.    Esophageal CA in remission  - s/p esophagectomy w gastric pull through  - chemo   - c/w  pantoprazole   -  CCB above should promote relaxation of GI smooth mm  - GI consult - planning for EGD once cleared by cardiology     BPH , Testosterone deficiency   - on monthly testosterone inj   - can increase risk of CV diseases , stroke  - check lipid profile -      Leukocytosis  - urine cx - neg 8/13   - check blood cx x2 - pending , lactate, procalcitonin wnl  - pending            Home Medications:  ciprofloxacin 500 mg oral tablet: 1 tab(s) orally 2 times a day x 14 days ****Course Complete*** (15 Aug 2023 08:32)  tadalafil 2.5 mg oral tablet: 2 tab(s) orally once a day as needed for BPH (15 Aug 2023 08:30)  Testosterone Cypionate 200 mg/mL intramuscular solution: 1.5 milliliter(s) intramuscularly once a month ***pt missed last dose*** (15 Aug 2023 08:31)  Vyvanse 70 mg oral capsule: 1 cap(s) orally once a day (15 Aug 2023 08:31)    Dispo - cardiology and GI evaluation , tele monitoring

## 2023-08-16 NOTE — PROGRESS NOTE ADULT - SUBJECTIVE AND OBJECTIVE BOX
CHIEF COMPLAINT: Patient is a 68y old  Male who presents with a chief complaint of chest pain (13 Aug 2023 13:58)    FROM H&P: 68 year old male w BPH,  esophageal CA in remission, COPD,  coronary artery calcification, DDD,    thyromegaly, tracheal stenosis returned to  ED c/o constant mid sternal chest pain for the last 15-20 minutes prior to this ED presentation    Had been admitted to hospitalist service and discharged  trop neg x 3  echo nl LVEF 60 %  seen by cardiology w scheduled follow up for new onset HTN  Also seen by GI who recommended adding omeprazole to cover for possibility of reflux  since pt had initially had nausea and chest pain    Went home and then pain began again and worsened.  BP at home was 199/105  +constant chest pain.  no SOB, cough, or nausea. has been feeling cold sweats.   CP radiated to back earlier. + light tingling in fingers.  felt that amlodipine improved symptoms  also given nitropaste for relief.    8/15. Intermittent chest pressure. Adjust BP meds. plan for NMST today.  8/16. Feeling better, plan for cath tomorrow    PREVIOUS DIAGNOSTIC TESTING:    last Stress test 2019 neg, echo w/ NVLEF in June 2023    MEDICATIONS:  amLODIPine   Tablet 10 milliGRAM(s) Oral daily  losartan 25 milliGRAM(s) Oral daily  pantoprazole    Tablet 40 milliGRAM(s) Oral before breakfast    MEDICATIONS:  acetaminophen     Tablet .. 650 milliGRAM(s) Oral every 6 hours PRN Mild Pain (1 - 3)  aluminum hydroxide/magnesium hydroxide/simethicone Suspension 30 milliLiter(s) Oral every 4 hours PRN Dyspepsia  hydrALAZINE 25 milliGRAM(s) Oral every 8 hours PRN for SBP >160  melatonin 3 milliGRAM(s) Oral at bedtime PRN Insomnia  nitroglycerin     SubLingual 0.4 milliGRAM(s) SubLingual every 5 minutes PRN Chest Pain  ondansetron Injectable 4 milliGRAM(s) IV Push every 6 hours PRN Nausea and/or Vomiting    HOME MEDICATIONS:  tadalafil 2.5 mg oral tablet: 1 tab(s) orally once a day as needed for (13 Aug 2023 13:35)  Vyvanse 70 mg oral capsule: 1 cap(s) orally as needed for (13 Aug 2023 13:35)    PHYSICAL EXAM:  T(C): 36.8 (16 Aug 2023 08:30), Max: 37.1 (15 Aug 2023 14:02)  T(F): 98.2 (16 Aug 2023 08:30), Max: 98.7 (15 Aug 2023 14:02)  HR: 62 (16 Aug 2023 09:29) (57 - 82)  BP: 106/58 (16 Aug 2023 08:30) (106/58 - 170/87)  BP(mean): 72 (15 Aug 2023 23:50) (72 - 103)  RR: 18 (16 Aug 2023 08:30) (18 - 18)  SpO2: 97% (16 Aug 2023 08:30) (97% - 100%)    Parameters below as of 16 Aug 2023 08:30  Patient On (Oxygen Delivery Method): room air    Constitutional: NAD, awake and alert  HEENT: PERR, EOMI, Normal Hearing, MMM  Neck: Soft and supple, No LAD, No JVD  Respiratory: Breath sounds are clear bilaterally, No wheezing, rales or rhonchi  Cardiovascular: S1 and S2, regular rate and rhythm, no Murmurs, gallops or rubs  Gastrointestinal: Bowel Sounds present, soft, nontender, nondistended, no guarding, no rebound  Extremities: No peripheral edema  Vascular: 2+ peripheral pulses  Neurological: A/O x 3, no focal deficits  Musculoskeletal: 5/5 strength b/l upper and lower extremities  Skin: No rashes    =======================================    INTERPRETATION OF TELEMETRY: SR - SB     ECG: < from: 12 Lead ECG (08.13.23 @ 11:08) >  Normal sinus rhythm  RSR' or QR pattern in V1 suggests right ventricular conduction delay    8/15: EKG: SR, no ischemic changes  ========================================    LABS:               17.3   15.18 )-----------( 349      ( 14 Aug 2023 21:46 )             48.8     08-15    135  |  102  |  22  ----------------------------<  106<H>  4.0   |  27  |  1.32<H>    Ca    9.4      15 Aug 2023 08:29  Mg     2.1     08-15    TPro  7.1  /  Alb  3.9  /  TBili  1.1  /  DBili  x   /  AST  23  /  ALT  33  /  AlkPhos  118  08-14    PT/INR - ( 14 Aug 2023 21:46 )   PT: 10.3 sec;   INR: 0.91 ratio       PTT - ( 14 Aug 2023 21:46 )  PTT:30.5 sec    Troponin <-14.27, <-14.46, <-27.17, <-20.62, <-15.24    CARDIAC TESTING:    < from: TTE Echo Complete w/o Contrast w/ Doppler (08.14.23 @ 08:52) >   The left ventricle is normal in size and contractility with LVEF   estimated   at 65%. Mild diastolic dysfunction (stage I).   Mild concentric left ventricular hypertrophy.   Normal appearing right ventricle structure and function.   Mild Aortic regurgitation.   Mild tricuspid valve regurgitation.    < from: NM Nuclear Stress Pharmacologic Multiple (08.15.23 @ 13:45) >  IMPRESSION: SPECT Myocardial Perfusion Imaging demonstrates:    Transient ischemic dilatation of the left ventricle with TID ratio =1.54,   concerning for severe ischemia.    No scan evidence of reversible or fixed perfusion defects.    Normal left ventricular contractility with an ejection fraction of 55%   (Normal: 50% or greater)    < end of copied text >      RADIOLOGY & ADDITIONAL STUDIES:      < from: CT Angio Chest Aorta w/wo IV Cont (08.15.23 @ 00:29) >  No aortic dissection or aneurysm or hemorrhage.    < end of copied text >  < from: Xray Chest 1 View- PORTABLE-Urgent (08.14.23 @ 22:23) >  No acute pulmonary disease.    Patient had follow-up CT.    < end of copied text >

## 2023-08-16 NOTE — PROGRESS NOTE ADULT - SUBJECTIVE AND OBJECTIVE BOX
Subjective:  Chief complain : chest pain , high BP , palpitations     HPI:  69 y/o with PMH of  Esophageal Ca s/p Esophagectomy with gastric pull-through, thyroid nodules  with thyromegaly with hyperfunctioning thyroid , tracheal stenosis and deviation,  COPD,  multiple lung nodules,  coronary artery calcification, DDD, BPH   thyromegaly present to ED with  c/o constant mid sternal chest pain for the last 15-20 minutes prior to this ED presentation . Had been admitted to hospitalist service and discharged , trop neg x 3, echo nl LVEF 60 %, seen by cardiology w scheduled follow up for new onset HTN. Also seen by GI who recommended adding omeprazole to cover for possibility of reflux since pt had initially had nausea and chest pain. Pt reports uncontrolled BP at home  199/105, constant pressure like  chest pain. +  cold sweats.  CP radiated to back earlier. + light tingling in fingers. felt that amlodipine improved symptoms also given nitropaste for relief.  In ED /109   HR 80    RR 18   T 98.9   100% sat RA, fentanyl 25, famotidine 20 mg, NTP, carafate, repeat CTA no dissection    8/15-   Patient seen and examined at bedside earlier today, s/p NST , awaiting results, pt feeling better, BP better controlled, denies cp, abdominal pain, sore throat, pain, afebrile, reports episodes of palpitations sweating and chest pain when BP elevated  8/16- feels better, denies cp, dyspnea, reports dyspepsia after any meals, afebrile, no palpitations, plan discussed     Review of system- Rest of the review of system are negative except mentioned in HPI    Vital sings reviewed for last 24 h  T(C): 36.9 (08-16-23 @ 15:43), Max: 36.9 (08-16-23 @ 15:43)  T(F): 98.5 (08-16-23 @ 15:43), Max: 98.5 (08-16-23 @ 15:43)  HR: 59 (08-16-23 @ 15:43) (57 - 82)  BP: 96/58 (08-16-23 @ 15:43) (96/58 - 170/87)  RR: 18 (08-16-23 @ 15:43) (18 - 18)  SpO2: 97% (08-16-23 @ 15:43) (97% - 100%)      Physical exam:   General : NAD, appear to be of stated age   NERVOUS SYSTEM:  Alert & Oriented X3, non- focal exam, Motor Strength 5/5 B/L upper and lower extremities; DTRs 2+ intact and symmetric  HEAD:  Atraumatic, Normocephalic  EYES: EOMI, PERRLA, conjunctiva and sclera clear  HEENT: Moist mucous membranes, Supple neck , No JVD  CHEST: Clear to auscultation bilaterally; No rales, no rhonchi, no wheezing  HEART: Regular rate and rhythm; No murmurs, no rubs or gallops  ABDOMEN: Soft, Non-tender, Non-distended; Bowel sounds present, no guarding , no peritoneal irritation   GENITOURINARY- Voiding, no suprapubic tenderness  EXTREMITIES:  2+ Peripheral Pulses, No clubbing, cyanosis,   edema  MUSCULOSKELETAL:- No muscle tenderness, Muscle tone normal, No joint tenderness, no Joint swelling,  Joint ROM –normal   SKIN-no rash, no lesion    Labs radiologic and other test : all reviewed and interpreted :                         16.8   11.72 )-----------( 367      ( 16 Aug 2023 08:20 )             47.9     08-16    138  |  106  |  23  ----------------------------<  96  4.0   |  25  |  1.26    Ca    9.3      16 Aug 2023 08:20  Phos  3.1     08-16  Mg     2.4     08-16    TPro  6.4  /  Alb  3.5  /  TBili  1.2  /  DBili  0.2  /  AST  14<L>  /  ALT  25  /  AlkPhos  107  08-16        LIVER FUNCTIONS - ( 16 Aug 2023 08:20 )  Alb: 3.5 g/dL / Pro: 6.4 gm/dL / ALK PHOS: 107 U/L / ALT: 25 U/L / AST: 14 U/L / GGT: x                                Thyroid Stimulating Hormone, Serum: 0.05 uU/mL (08.15.23 @ 08:29)    Free Thyroxine, Serum: 1.95 ng/dL (08.15.23 @ 16:27)      NM Nuclear Stress Pharmacologic Multiple (08.15.23 @ 13:45) >  IMPRESSION: SPECT Myocardial Perfusion Imaging demonstrates:    Transient ischemic dilatation of the left ventricle with TID ratio =1.54,   concerning for severe ischemia.    No scan evidence of reversible or fixed perfusion defects.    Normal left ventricular contractility with an ejection fraction of 55%   (Normal: 50% or greater)    Please refer to cardiac stress test report for dosage of Regadenoson   administered, EKG findings and symptoms during the procedure.    : CT Angio Chest Aorta w/wo IV Cont (08.15.23 @ 00:29) >  LUNGS AND LARGE AIRWAYS: Patent central airways.  2 mm left upper lobe nodule (2:29) unchanged.  4 mm nodule along the right minor fissure (2:64) unchanged.  PLEURA: No pleural effusion.  VESSELS: Within normal limits. No aortic dissection or aneurysm or   hemorrhage. Mediport tip in superior vena cava.  HEART: Heart size is normal. No pericardial effusion.  MEDIASTINUM AND MERY: No lymphadenopathy.  CHEST WALL AND LOWER NECK: Substernal thyroid goiter. This would be   better evaluated with nonemergent thyroid ultrasound.    ABDOMEN AND PELVIS:  LIVER: Within normal limits.  BILE DUCTS:Normal caliber.  GALLBLADDER: Within normal limits.  SPLEEN: Within normal limits.  PANCREAS: Within normal limits.  ADRENALS: Within normal limits.  KIDNEYS/URETERS: Within normal limits.    BLADDER: Within normal limits.  REPRODUCTIVE ORGANS: Prostate is enlarged.    BOWEL: Esophagectomy. Gastric pull-through. No bowel obstruction.   Appendix is not visualized. No evidence of inflammation in the pericecal   region.  PERITONEUM: No ascites.  VESSELS: Atherosclerotic changes of the distal aorta. No aortic   dissection or aneurysm or hemorrhage.  RETROPERITONEUM/LYMPH NODES: No lymphadenopathy.  ABDOMINAL WALL: Within normal limits.  BONES: Degenerative changes.    IMPRESSION:    No aortic dissection or aneurysm or hemorrhage.    < from: Xray Chest 1 View- PORTABLE-Urgent (08.14.23 @ 22:23) >  Impression:    No acute pulmonary disease.    Patient had follow-up CT.    < end of copied text >          RECENT CULTURES:      Cardiac testing : reviewed   EKG    12 Lead ECG (08.15.23 @ 07:52) >  Ventricular Rate 71 BPM   Normal sinus rhythm  Right ventricular conduction delay  When compared with ECG of 14-AUG-2023 20:56,  No significant change was found     12 Lead ECG (08.13.23 @ 10:06) >  Ventricular Rate 63 BPM   Normal sinus rhythm  Right bundle branch block  Abnormal ECG      - TroponinI hsT: <-14.27, <-14.46, <-27.17, <-20.62, <-15.24     TTE Echo Complete w/o Contrast w/ Doppler (08.14.23 @ 08:52) >   The left ventricle is normal in size and contractility with LVEF   estimated   at 65%. Mild diastolic dysfunction (stage I).   Mild concentric left ventricular hypertrophy.   Normal appearing right ventricle structure and function.   Mildaortic regurgitation.   Mild tricuspid valve regurgitation.        Procedures :     Devices: StartSampling     MEDICATIONS  (STANDING):  amLODIPine   Tablet 10 milliGRAM(s) Oral daily  pantoprazole    Tablet 40 milliGRAM(s) Oral before breakfast  propranolol 10 milliGRAM(s) Oral every 12 hours    MEDICATIONS  (PRN):  acetaminophen     Tablet .. 650 milliGRAM(s) Oral every 6 hours PRN Mild Pain (1 - 3)  aluminum hydroxide/magnesium hydroxide/simethicone Suspension 30 milliLiter(s) Oral every 4 hours PRN Dyspepsia  hydrALAZINE 10 milliGRAM(s) Oral every 6 hours PRN for SBP >140  melatonin 3 milliGRAM(s) Oral at bedtime PRN Insomnia  nitroglycerin     SubLingual 0.4 milliGRAM(s) SubLingual every 5 minutes PRN Chest Pain  ondansetron Injectable 4 milliGRAM(s) IV Push every 6 hours PRN Nausea and/or Vomiting

## 2023-08-16 NOTE — PROGRESS NOTE ADULT - ASSESSMENT
68 year old male w BPH,  esophageal CA in remission, COPD,  coronary artery calcification, DDD,   thyromegaly, tracheal stenosis returned to  ED c/o constant mid sternal chest pain.    #Chest pain r/o ACS  #HTN urgency    Monitor on tele. SR - SB, no events thus far   Troponin remain negative, repeat EKG without ischemic changes  Echo 8/14 EF 65%, mild TR  Repeat CT Chest/ Abd/ Pelvis - no aortic dissection, no pulmonary pathology. Noted with coronary arthrosclerosis.  Continue with Norvasc 10mg and Propanolol started by primary team given hyperthyroidism   On NMST from 8/15 noted with transient ischemic dilatation of the left ventricle with TID ratio =1.54, concerning for severe ischemia. Plan for cardiac cath in AM.  Started Atorvastatin and c/w aspirin daily    Case d/w Dr. Perry

## 2023-08-17 ENCOUNTER — APPOINTMENT (OUTPATIENT)
Dept: UROLOGY | Facility: CLINIC | Age: 68
End: 2023-08-17

## 2023-08-17 ENCOUNTER — TRANSCRIPTION ENCOUNTER (OUTPATIENT)
Age: 68
End: 2023-08-17

## 2023-08-17 VITALS
RESPIRATION RATE: 17 BRPM | TEMPERATURE: 98 F | SYSTOLIC BLOOD PRESSURE: 134 MMHG | HEART RATE: 60 BPM | DIASTOLIC BLOOD PRESSURE: 81 MMHG | OXYGEN SATURATION: 100 %

## 2023-08-17 LAB
ALBUMIN SERPL ELPH-MCNC: 3.4 G/DL — SIGNIFICANT CHANGE UP (ref 3.3–5)
ALP SERPL-CCNC: 100 U/L — SIGNIFICANT CHANGE UP (ref 40–120)
ALT FLD-CCNC: 22 U/L — SIGNIFICANT CHANGE UP (ref 12–78)
ANION GAP SERPL CALC-SCNC: 7 MMOL/L — SIGNIFICANT CHANGE UP (ref 5–17)
AST SERPL-CCNC: 11 U/L — LOW (ref 15–37)
BASOPHILS # BLD AUTO: 0 K/UL — SIGNIFICANT CHANGE UP (ref 0–0.2)
BASOPHILS NFR BLD AUTO: 0 % — SIGNIFICANT CHANGE UP (ref 0–2)
BILIRUB SERPL-MCNC: 1.2 MG/DL — SIGNIFICANT CHANGE UP (ref 0.2–1.2)
BUN SERPL-MCNC: 28 MG/DL — HIGH (ref 7–23)
CALCIUM SERPL-MCNC: 8.9 MG/DL — SIGNIFICANT CHANGE UP (ref 8.5–10.1)
CHLORIDE SERPL-SCNC: 107 MMOL/L — SIGNIFICANT CHANGE UP (ref 96–108)
CO2 SERPL-SCNC: 24 MMOL/L — SIGNIFICANT CHANGE UP (ref 22–31)
CREAT SERPL-MCNC: 1.29 MG/DL — SIGNIFICANT CHANGE UP (ref 0.5–1.3)
CRP SERPL-MCNC: <3 MG/L — SIGNIFICANT CHANGE UP
EGFR: 60 ML/MIN/1.73M2 — SIGNIFICANT CHANGE UP
EOSINOPHIL # BLD AUTO: 0.11 K/UL — SIGNIFICANT CHANGE UP (ref 0–0.5)
EOSINOPHIL NFR BLD AUTO: 1 % — SIGNIFICANT CHANGE UP (ref 0–6)
GLUCOSE SERPL-MCNC: 98 MG/DL — SIGNIFICANT CHANGE UP (ref 70–99)
HCT VFR BLD CALC: 45.6 % — SIGNIFICANT CHANGE UP (ref 39–50)
HGB BLD-MCNC: 15.9 G/DL — SIGNIFICANT CHANGE UP (ref 13–17)
LYMPHOCYTES # BLD AUTO: 2.14 K/UL — SIGNIFICANT CHANGE UP (ref 1–3.3)
LYMPHOCYTES # BLD AUTO: 20 % — SIGNIFICANT CHANGE UP (ref 13–44)
MAGNESIUM SERPL-MCNC: 2.3 MG/DL — SIGNIFICANT CHANGE UP (ref 1.6–2.6)
MANUAL SMEAR VERIFICATION: SIGNIFICANT CHANGE UP
MCHC RBC-ENTMCNC: 30.1 PG — SIGNIFICANT CHANGE UP (ref 27–34)
MCHC RBC-ENTMCNC: 34.9 GM/DL — SIGNIFICANT CHANGE UP (ref 32–36)
MCV RBC AUTO: 86.4 FL — SIGNIFICANT CHANGE UP (ref 80–100)
MONOCYTES # BLD AUTO: 0.96 K/UL — HIGH (ref 0–0.9)
MONOCYTES NFR BLD AUTO: 9 % — SIGNIFICANT CHANGE UP (ref 2–14)
NEUTROPHILS # BLD AUTO: 7.39 K/UL — SIGNIFICANT CHANGE UP (ref 1.8–7.4)
NEUTROPHILS NFR BLD AUTO: 67 % — SIGNIFICANT CHANGE UP (ref 43–77)
NEUTS BAND # BLD: 2 % — SIGNIFICANT CHANGE UP (ref 0–8)
NRBC # BLD: 0 /100 — SIGNIFICANT CHANGE UP (ref 0–0)
NRBC # BLD: SIGNIFICANT CHANGE UP /100 WBCS (ref 0–0)
NT-PROBNP SERPL-SCNC: 33 PG/ML — SIGNIFICANT CHANGE UP (ref 0–125)
PHOSPHATE SERPL-MCNC: 3.1 MG/DL — SIGNIFICANT CHANGE UP (ref 2.5–4.5)
PLAT MORPH BLD: NORMAL — SIGNIFICANT CHANGE UP
PLATELET # BLD AUTO: 327 K/UL — SIGNIFICANT CHANGE UP (ref 150–400)
POTASSIUM SERPL-MCNC: 4.1 MMOL/L — SIGNIFICANT CHANGE UP (ref 3.5–5.3)
POTASSIUM SERPL-SCNC: 4.1 MMOL/L — SIGNIFICANT CHANGE UP (ref 3.5–5.3)
PROT SERPL-MCNC: 6.2 GM/DL — SIGNIFICANT CHANGE UP (ref 6–8.3)
RBC # BLD: 5.28 M/UL — SIGNIFICANT CHANGE UP (ref 4.2–5.8)
RBC # FLD: 13 % — SIGNIFICANT CHANGE UP (ref 10.3–14.5)
RBC BLD AUTO: NORMAL — SIGNIFICANT CHANGE UP
SODIUM SERPL-SCNC: 138 MMOL/L — SIGNIFICANT CHANGE UP (ref 135–145)
VARIANT LYMPHS # BLD: 1 % — SIGNIFICANT CHANGE UP (ref 0–6)
WBC # BLD: 10.71 K/UL — HIGH (ref 3.8–10.5)
WBC # FLD AUTO: 10.71 K/UL — HIGH (ref 3.8–10.5)

## 2023-08-17 PROCEDURE — 99239 HOSP IP/OBS DSCHRG MGMT >30: CPT

## 2023-08-17 RX ORDER — ATORVASTATIN CALCIUM 80 MG/1
1 TABLET, FILM COATED ORAL
Qty: 30 | Refills: 0
Start: 2023-08-17 | End: 2023-09-15

## 2023-08-17 RX ORDER — PANTOPRAZOLE SODIUM 20 MG/1
1 TABLET, DELAYED RELEASE ORAL
Qty: 30 | Refills: 0
Start: 2023-08-17 | End: 2023-09-15

## 2023-08-17 RX ORDER — PROPRANOLOL HCL 160 MG
1 CAPSULE, EXTENDED RELEASE 24HR ORAL
Qty: 60 | Refills: 0
Start: 2023-08-17 | End: 2023-09-15

## 2023-08-17 RX ORDER — SODIUM CHLORIDE 9 MG/ML
250 INJECTION INTRAMUSCULAR; INTRAVENOUS; SUBCUTANEOUS ONCE
Refills: 0 | Status: COMPLETED | OUTPATIENT
Start: 2023-08-17 | End: 2023-08-17

## 2023-08-17 RX ORDER — ASPIRIN/CALCIUM CARB/MAGNESIUM 324 MG
1 TABLET ORAL
Qty: 30 | Refills: 0
Start: 2023-08-17 | End: 2023-09-15

## 2023-08-17 RX ORDER — SODIUM CHLORIDE 9 MG/ML
1000 INJECTION INTRAMUSCULAR; INTRAVENOUS; SUBCUTANEOUS
Refills: 0 | Status: DISCONTINUED | OUTPATIENT
Start: 2023-08-17 | End: 2023-08-17

## 2023-08-17 RX ORDER — CHOLECALCIFEROL (VITAMIN D3) 125 MCG
1 CAPSULE ORAL
Qty: 30 | Refills: 0
Start: 2023-08-17 | End: 2023-09-15

## 2023-08-17 RX ADMIN — SODIUM CHLORIDE 162 MILLILITER(S): 9 INJECTION INTRAMUSCULAR; INTRAVENOUS; SUBCUTANEOUS at 09:32

## 2023-08-17 RX ADMIN — SODIUM CHLORIDE 250 MILLILITER(S): 9 INJECTION INTRAMUSCULAR; INTRAVENOUS; SUBCUTANEOUS at 07:27

## 2023-08-17 RX ADMIN — Medication 81 MILLIGRAM(S): at 11:09

## 2023-08-17 NOTE — DISCHARGE NOTE NURSING/CASE MANAGEMENT/SOCIAL WORK - PATIENT PORTAL LINK FT
You can access the FollowMyHealth Patient Portal offered by [facility] by registering at the following website: [email or web address]. By joining [facility]’s FollowMyHealth portal, you will also be able to view your health information using other applications (apps) compatible with our system.

## 2023-08-17 NOTE — DISCHARGE NOTE NURSING/CASE MANAGEMENT/SOCIAL WORK - NSDCPEFALRISK_GEN_ALL_CORE
For information on Fall & Injury Prevention, visit: [email or web address] OR  [email or web address] OR  https://www.cdc.gov/steadi/patient.html patient

## 2023-08-17 NOTE — PROGRESS NOTE ADULT - ASSESSMENT
68 year old male w BPH,  esophageal CA in remission, COPD,  coronary artery calcification, DDD,   thyromegaly, tracheal stenosis returned to  ED c/o constant mid sternal chest pain.    #Chest pain. Possible GI related.   #HTN urgency. Resolved.  #CAD.  #Hyperthyroidism      Monitor on tele. SR - SB, no events thus far   Troponin remain negative, repeat EKG without ischemic changes  Echo 8/14 EF 65%, mild TR  Repeat CT Chest/ Abd/ Pelvis - no aortic dissection, no pulmonary pathology. Noted with coronary arthrosclerosis.  Continue with  Propanolol started by primary team given hyperthyroidism, PRN norvasc. BP appears stable w/ propanolol   On NMST from 8/15 noted with transient ischemic dilatation of the left ventricle with TID ratio =1.54, concerning for severe ischemia. S/P LHC non-obstructive CAD   C/w Atorvastatin and aspirin    Case d/w Dr. Hodge  Will sign off, call with questions, outpatient FU

## 2023-08-17 NOTE — DISCHARGE NOTE PROVIDER - CARE PROVIDER_API CALL
Reed Lee  Cardiology  [address] [place] [address]  Phone: [phone]  Fax: [phone]  Follow Up Time: 1 week    Alonso Cross  Gastroenterology  [address] [place] [address]  Phone: [phone]  Fax: [phone]  Follow Up Time: 1 week    oncology,   Phone: (   )    -  Fax: (   )    -  Follow Up Time: 1 week    Víctor Jasmine  Internal Medicine  [address] [place] [address]  Phone: [phone]  Fax: ()-  Follow Up Time: 1 week

## 2023-08-17 NOTE — PROVIDER CONTACT NOTE (OTHER) - SITUATION
Faxed discharge instructions to office. - Nneka PRESLEY
Consult called in spoke with Cherelle from answering service

## 2023-08-17 NOTE — DISCHARGE NOTE PROVIDER - YES NO FOR [facility] POSITIVE OR NEGATIVE COVID RESULT
[facility]  To promote your recovery after you leave the hospital, your physician has ordered home care. [facility] will contact you after discharge to set up the first appointment. If you have any questions please contact [facility] at [phone] or [phone].   Thank you. ______________________________________________________________________ ,

## 2023-08-17 NOTE — DISCHARGE NOTE PROVIDER - CARE PROVIDERS DIRECT ADDRESSES
[email or web address],DirectAddress_Unknown,DirectAddress_Unknown,DirectAddress_Unknown

## 2023-08-17 NOTE — DISCHARGE NOTE PROVIDER - NSDCCPTREATMENT_GEN_ALL_CORE_FT
PRINCIPAL PROCEDURE  Procedure: CT angiogram chest w contrast  Findings and Treatment: FINDINGS:  CHEST:  LUNGS AND LARGE AIRWAYS: Patent central airways.  2 mm left upper lobe nodule (2:29) unchanged.  4 mm nodule along the right minor fissure (2:64) unchanged.  PLEURA: No pleural effusion.  VESSELS: Within normal limits. No aortic dissection or aneurysm or   hemorrhage. Mediport tip in superior vena cava.  HEART: Heart size is normal. No pericardial effusion.  MEDIASTINUM AND MERY: No lymphadenopathy.  CHEST WALL AND LOWER NECK: Substernal thyroid goiter. This would be   better evaluated with nonemergent thyroid ultrasound.  ABDOMEN AND PELVIS:  LIVER: Within normal limits.  BILE DUCTS:Normal caliber.  GALLBLADDER: Within normal limits.  SPLEEN: Within normal limits.  PANCREAS: Within normal limits.  ADRENALS: Within normal limits.  KIDNEYS/URETERS: Within normal limits.  BLADDER: Within normal limits.  REPRODUCTIVE ORGANS: Prostate is enlarged.  BOWEL: Esophagectomy. Gastric pull-through. No bowel obstruction.   Appendix is not visualized. No evidence of inflammation in the pericecal   region.  PERITONEUM: No ascites.  VESSELS: Atherosclerotic changes of the distal aorta. No aortic   dissection or aneurysm or hemorrhage.  RETROPERITONEUM/LYMPH NODES: No lymphadenopathy.  ABDOMINAL WALL: Within normal limits.  BONES: Degenerative changes.  IMPRESSION:  No aortic dissection or aneurysm or hemorrhage.        SECONDARY PROCEDURE  Procedure: Nuclear medicine cardiopulmonary stress test  Findings and Treatment: IMPRESSION: SPECT Myocardial Perfusion Imaging demonstrates:  Transient ischemic dilatation of the left ventricle with TID ratio =1.54,   concerning for severe ischemia.  No scan evidence of reversible or fixed perfusion defects.  Normal left ventricular contractility with an ejection fraction of 55%   (Normal: 50% or greater)  Please refer to cardiac stress test report for dosage of Regadenoson   administered, EKG findings and symptoms during the procedure.      Procedure: 2D echo  Findings and Treatment: The left ventricle is normal in size and contractility with LVEF   estimated   at 65%. Mild diastolic dysfunction (stage I).   Mild concentric left ventricular hypertrophy.   Normal appearing right ventricle structure and function.   Mildaortic regurgitation.   Mild tricuspid valve regurgitation.

## 2023-08-17 NOTE — CHART NOTE - NSCHARTNOTEFT_GEN_A_CORE
Consent obtained for and signed for cardiac cath with coronary angiogram and possible stent placement with possible sedation and analgesia. [facility] risks, benefits and alternatives discussed with patient. Risk discussed included, but not limited to MI, stroke, mortality, major bleeding, arrythmia, or infection, educational material provided. Pt. verbalizes and understands pre-procedural instructions.   ASA: II   Bleeding Risk Score: 1.9  Creatinine: 1.26  GFR:  61  Darryl Score: NICHOLAS 3 points   Pt. pre-hydrated with sodium chloride 250cc bolus IV x1

## 2023-08-17 NOTE — DISCHARGE NOTE PROVIDER - PROVIDER TOKENS
PROVIDER:[TOKEN:[[id number]],FOLLOWUP:[1 week]],PROVIDER:[TOKEN:[[id number]],FOLLOWUP:[1 week]],FREE:[LAST:[oncology],PHONE:[(   )    -],FAX:[(   )    -],FOLLOWUP:[1 week]],PROVIDER:[TOKEN:[[id number]],FOLLOWUP:[1 week]]

## 2023-08-17 NOTE — DISCHARGE NOTE PROVIDER - NSDCFUSCHEDAPPT_GEN_ALL_CORE_FT
[facility]  Grzegorz D  Scheduled Appointment: 08/19/2023    [facility]  Grzegorz D  Scheduled Appointment: 08/19/2023

## 2023-08-17 NOTE — CHART NOTE - NSCHARTNOTEFT_GEN_A_CORE
Nurse Practitioner Progress note:     HPI:  68 year old male w BPH,  esophageal CA in remission, COPD,  coronary artery calcification, DDD, thyromegaly, tracheal stenosis returned to  ED c/o constant mid sternal chest pain for the last 15-20 minutes prior to this ED presentation    Had been admitted to hospitalist service and discharged  trop neg x 3  echo nl LVEF 60 %  seen by cardiology w scheduled follow up for new onset HTN  Also seen by GI who recommended adding omeprazole to cover for possibility of reflux  since pt had initially had nausea and chest pain    Went home and then pain began again and worsened.  BP at home was 199/105  +constant chest pain.  no SOB, cough, or nausea. has been feeling cold sweats.   CP radiated to back earlier. + light tingling in fingers.  felt that amlodipine improved symptoms  also given nitropaste for relief.       In ED /109   HR 80    RR 18   T 98.9   100% sat RA  fentanyl 25  famotidine 20 mg  NTP  carafate  repeat CTA no dissection        PAST MEDICAL HX  Benign prostatic hyperplasia with obstruction/lower urinary tract symptoms  COPD with emphysema   Coronary artery calcification   DDD (degenerative disc disease), lumbar   Emphysema lung   Esophageal cancer CA last chemo   Esophageal dilatation   Inguinal hernia   Lumbar degenerative disc disease   Multiple lung nodules  Right arm fracture   Sciatica   Testosterone deficiency   Thyromegaly   Tracheal deviation   Tracheal stenosis   Urinary hesitancy   Urinary urgency       PAST SURGICAL HX  Esophagectomy with gastric pull-through  Inguinal hernia repair 2016  S/P tonsillectomy 5y/o  Varicocele right 1981      FAMILY HX  Family history of lung cancer   Family history of malignant neoplasm of breast  Denied: Family history of malignant neoplasm of prostate : Father    SOCIAL HX    Former smoker       T(C): 37 (08-16-23 @ 23:37), Max: 37 (08-16-23 @ 23:37)  HR: 62 (08-17-23 @ 08:40) (58 - 69)  BP: 118/64 (08-17-23 @ 08:40) (93/53 - 118/64)  RR: 16 (08-17-23 @ 08:40) (16 - 18)  SpO2: 98% (08-17-23 @ 08:40) (95% - 100%)  Wt(kg): --    PHYSICAL EXAM:  Neurologic: Non-focal, AxOx3.  No neuro deficits  Vascular: Peripheral pulses palpable 2+ bilaterally  Procedure Site: Rt. groin soft no bleeding no hematoma +1 PP     	  PROCEDURE RESULTS:  S/P LHC non-obstructive CAD         ASSESSMENT/PLAN: 	  68 year old male w BPH,  esophageal CA in remission, COPD,  coronary artery calcification, DDD, thyromegaly, tracheal stenosis returned to  ED c/o constant mid sternal chest pain for the last 15-20 minutes prior to this ED presentation  echo nl LVEF 60 %  seen by cardiology w scheduled follow up for new onset HTN  Also seen by GI who recommended adding omeprazole to cover for possibility of reflux  since pt had initially had nausea and chest pain  Went home and then pain began again and worsened.  BP at home was 199/105  +constant chest pain.  S/P [facility]     -VS, labs, diet, activity as per post cath orders  -IV hydration  -Encourage PO fluids  -Sedation instructions reviewed with patient   -Continue current medications  -Plan of care D/W pt. and MD  -Post cath instructions reviewed with pt., pt. verbalizes and understands instructions  -Follow-up with attending/cardiologist

## 2023-08-17 NOTE — DISCHARGE NOTE PROVIDER - NSDCMRMEDTOKEN_GEN_ALL_CORE_FT
aspirin 81 mg oral delayed release tablet: 1 tab(s) orally once a day  atorvastatin 20 mg oral tablet: 1 tab(s) orally once a day (at bedtime)  cholecalciferol 25 mcg (1000 intl units) oral tablet: 1 tab(s) orally once a day (at bedtime)  pantoprazole 40 mg oral delayed release tablet: 1 tab(s) orally once a day  propranolol 10 mg oral tablet: 1 tab(s) orally every 12 hours  tadalafil 2.5 mg oral tablet: 2 tab(s) orally once a day as needed for BPH  Vyvanse 70 mg oral capsule: 1 cap(s) orally once a day

## 2023-08-17 NOTE — PHARMACOTHERAPY INTERVENTION NOTE - COMMENTS
New medications reviewed w/ patient, patient provided w/ medication cards, all questions answered 
Medication history complete, reviewed medication with patient and confirmed with DrFirst.

## 2023-08-17 NOTE — PROGRESS NOTE ADULT - SUBJECTIVE AND OBJECTIVE BOX
CHIEF COMPLAINT: Patient is a 68y old  Male who presents with a chief complaint of chest pain (13 Aug 2023 13:58)    FROM H&P: 68 year old male w BPH,  esophageal CA in remission, COPD,  coronary artery calcification, DDD,    thyromegaly, tracheal stenosis returned to  ED c/o constant mid sternal chest pain for the last 15-20 minutes prior to this ED presentation    Had been admitted to hospitalist service and discharged  trop neg x 3  echo nl LVEF 60 %  seen by cardiology w scheduled follow up for new onset HTN  Also seen by GI who recommended adding omeprazole to cover for possibility of reflux  since pt had initially had nausea and chest pain    Went home and then pain began again and worsened.  BP at home was 199/105  +constant chest pain.  no SOB, cough, or nausea. has been feeling cold sweats.   CP radiated to back earlier. + light tingling in fingers.  felt that amlodipine improved symptoms  also given nitropaste for relief.    8/15. Intermittent chest pressure. Adjust BP meds. plan for NMST today.  8/16. Feeling better, plan for cath tomorrow  8/17. Doing ok, seen in cath lab, difficulty with swallowing, good appetite    PREVIOUS DIAGNOSTIC TESTING:    last Stress test 2019 neg, echo w/ NVLEF in June 2023    MEDICATIONS:  amLODIPine   Tablet 10 milliGRAM(s) Oral daily  losartan 25 milliGRAM(s) Oral daily  pantoprazole    Tablet 40 milliGRAM(s) Oral before breakfast    MEDICATIONS:  MEDICATIONS  (STANDING):  aspirin enteric coated 81 milliGRAM(s) Oral daily  atorvastatin 20 milliGRAM(s) Oral at bedtime  cholecalciferol 1000 Unit(s) Oral at bedtime  pantoprazole    Tablet 40 milliGRAM(s) Oral before breakfast  propranolol 10 milliGRAM(s) Oral every 12 hours  sodium chloride 0.9%. 1000 milliLiter(s) (162 mL/Hr) IV Continuous <Continuous>    MEDICATIONS  (PRN):  acetaminophen     Tablet .. 650 milliGRAM(s) Oral every 6 hours PRN Mild Pain (1 - 3)  aluminum hydroxide/magnesium hydroxide/simethicone Suspension 30 milliLiter(s) Oral every 4 hours PRN Dyspepsia  amLODIPine   Tablet 2.5 milliGRAM(s) Oral every 24 hours PRN for SBP >140  melatonin 3 milliGRAM(s) Oral at bedtime PRN Insomnia  nitroglycerin     SubLingual 0.4 milliGRAM(s) SubLingual every 5 minutes PRN Chest Pain  ondansetron Injectable 4 milliGRAM(s) IV Push every 6 hours PRN Nausea and/or Vomiting    HOME MEDICATIONS:  tadalafil 2.5 mg oral tablet: 1 tab(s) orally once a day as needed for (13 Aug 2023 13:35)  Vyvanse 70 mg oral capsule: 1 cap(s) orally as needed for (13 Aug 2023 13:35)    PHYSICAL EXAM:  T(C): 37 (16 Aug 2023 23:37), Max: 37 (16 Aug 2023 23:37)  T(F): 98.6 (16 Aug 2023 23:37), Max: 98.6 (16 Aug 2023 23:37)  HR: 59 (17 Aug 2023 09:20) (56 - 69)  BP: 118/68 (17 Aug 2023 09:20) (93/53 - 120/68)  RR: 16 (17 Aug 2023 09:20) (16 - 18)  SpO2: 100% (17 Aug 2023 09:20) (95% - 100%)    Parameters below as of 17 Aug 2023 09:50  Patient On (Oxygen Delivery Method): nasal cannula    Constitutional: NAD, awake and alert  HEENT: PERR, EOMI, Normal Hearing, MMM  Neck: Soft and supple, No LAD, No JVD  Respiratory: Breath sounds are clear bilaterally, No wheezing, rales or rhonchi  Cardiovascular: S1 and S2, regular rate and rhythm, no Murmurs, gallops or rubs  Gastrointestinal: Bowel Sounds present, soft, nontender, nondistended, no guarding, no rebound  Extremities: No peripheral edema  Vascular: 2+ peripheral pulses  Neurological: A/O x 3, no focal deficits  Musculoskeletal: 5/5 strength b/l upper and lower extremities  Skin: No rashes    =======================================    INTERPRETATION OF TELEMETRY: SR - SB     ECG: < from: 12 Lead ECG (08.13.23 @ 11:08) >  Normal sinus rhythm  RSR' or QR pattern in V1 suggests right ventricular conduction delay    8/15: EKG: SR, no ischemic changes  ========================================    LABS:    LABS: All Labs Reviewed:                        16.8   11.72 )-----------( 367      ( 16 Aug 2023 08:20 )             47.9     08-16    138  |  106  |  23  ----------------------------<  96  4.0   |  25  |  1.26    Ca    9.3      16 Aug 2023 08:20  Phos  3.1     08-16  Mg     2.4     08-16    TPro  6.4  /  Alb  3.5  /  TBili  1.2  /  DBili  0.2  /  AST  14<L>  /  ALT  25  /  AlkPhos  107  08-16    Troponin: 15.24 ng/L, Troponin: 20.62 ng/L, Troponin: 27.17 ng/L, Troponin: 14.46 ng/L, Troponin: 14.27 ng/L      CARDIAC TESTING:    < from: TTE Echo Complete w/o Contrast w/ Doppler (08.14.23 @ 08:52) >   The left ventricle is normal in size and contractility with LVEF   estimated   at 65%. Mild diastolic dysfunction (stage I).   Mild concentric left ventricular hypertrophy.   Normal appearing right ventricle structure and function.   Mild Aortic regurgitation.   Mild tricuspid valve regurgitation.    < from: NM Nuclear Stress Pharmacologic Multiple (08.15.23 @ 13:45) >  IMPRESSION: SPECT Myocardial Perfusion Imaging demonstrates:    Transient ischemic dilatation of the left ventricle with TID ratio =1.54,   concerning for severe ischemia.    No scan evidence of reversible or fixed perfusion defects.    Normal left ventricular contractility with an ejection fraction of 55%   (Normal: 50% or greater)      RADIOLOGY & ADDITIONAL STUDIES:      < from: CT Angio Chest Aorta w/wo IV Cont (08.15.23 @ 00:29) >  No aortic dissection or aneurysm or hemorrhage.    < end of copied text >  < from: Xray Chest 1 View- PORTABLE-Urgent (08.14.23 @ 22:23) >  No acute pulmonary disease.    Patient had follow-up CT.    < end of copied text >

## 2023-08-18 LAB — TESTOST FREE SERPL-MCNC: 10.4 PG/ML — SIGNIFICANT CHANGE UP (ref 5.9–27)

## 2023-08-19 ENCOUNTER — OUTPATIENT (OUTPATIENT)
Dept: OUTPATIENT SERVICES | Facility: HOSPITAL | Age: 68
LOS: 1 days | End: 2023-08-19
Payer: MEDICARE

## 2023-08-19 ENCOUNTER — APPOINTMENT (OUTPATIENT)
Dept: NUCLEAR MEDICINE | Facility: CLINIC | Age: 68
End: 2023-08-19
Payer: MEDICARE

## 2023-08-19 DIAGNOSIS — Z00.8 ENCOUNTER FOR OTHER GENERAL EXAMINATION: ICD-10-CM

## 2023-08-19 DIAGNOSIS — I86.1 SCROTAL VARICES: Chronic | ICD-10-CM

## 2023-08-19 DIAGNOSIS — Z87.19 PERSONAL HISTORY OF OTHER DISEASES OF THE DIGESTIVE SYSTEM: Chronic | ICD-10-CM

## 2023-08-19 DIAGNOSIS — Z90.89 ACQUIRED ABSENCE OF OTHER ORGANS: Chronic | ICD-10-CM

## 2023-08-19 PROCEDURE — 78815 PET IMAGE W/CT SKULL-THIGH: CPT | Mod: MH

## 2023-08-19 PROCEDURE — 78815 PET IMAGE W/CT SKULL-THIGH: CPT | Mod: 26,PS,MH

## 2023-08-19 PROCEDURE — A9552: CPT

## 2023-08-21 DIAGNOSIS — E29.1 TESTICULAR HYPOFUNCTION: ICD-10-CM

## 2023-08-21 DIAGNOSIS — N40.0 BENIGN PROSTATIC HYPERPLASIA WITHOUT LOWER URINARY TRACT SYMPTOMS: ICD-10-CM

## 2023-08-21 DIAGNOSIS — E05.20 THYROTOXICOSIS WITH TOXIC MULTINODULAR GOITER WITHOUT THYROTOXIC CRISIS OR STORM: ICD-10-CM

## 2023-08-21 DIAGNOSIS — I16.0 HYPERTENSIVE URGENCY: ICD-10-CM

## 2023-08-21 DIAGNOSIS — Z85.01 PERSONAL HISTORY OF MALIGNANT NEOPLASM OF ESOPHAGUS: ICD-10-CM

## 2023-08-21 DIAGNOSIS — R91.8 OTHER NONSPECIFIC ABNORMAL FINDING OF LUNG FIELD: ICD-10-CM

## 2023-08-21 DIAGNOSIS — Z87.891 PERSONAL HISTORY OF NICOTINE DEPENDENCE: ICD-10-CM

## 2023-08-21 DIAGNOSIS — M51.36 OTHER INTERVERTEBRAL DISC DEGENERATION, LUMBAR REGION: ICD-10-CM

## 2023-08-21 DIAGNOSIS — J43.9 EMPHYSEMA, UNSPECIFIED: ICD-10-CM

## 2023-08-21 DIAGNOSIS — N18.9 CHRONIC KIDNEY DISEASE, UNSPECIFIED: ICD-10-CM

## 2023-08-21 DIAGNOSIS — I25.10 ATHEROSCLEROTIC HEART DISEASE OF NATIVE CORONARY ARTERY WITHOUT ANGINA PECTORIS: ICD-10-CM

## 2023-08-21 LAB
CULTURE RESULTS: SIGNIFICANT CHANGE UP
CULTURE RESULTS: SIGNIFICANT CHANGE UP
SPECIMEN SOURCE: SIGNIFICANT CHANGE UP
SPECIMEN SOURCE: SIGNIFICANT CHANGE UP

## 2023-08-24 ENCOUNTER — APPOINTMENT (OUTPATIENT)
Dept: UROLOGY | Facility: CLINIC | Age: 68
End: 2023-08-24
Payer: MEDICARE

## 2023-08-24 ENCOUNTER — OUTPATIENT (OUTPATIENT)
Dept: OUTPATIENT SERVICES | Facility: HOSPITAL | Age: 68
LOS: 1 days | End: 2023-08-24
Payer: MEDICARE

## 2023-08-24 VITALS
SYSTOLIC BLOOD PRESSURE: 99 MMHG | DIASTOLIC BLOOD PRESSURE: 62 MMHG | OXYGEN SATURATION: 100 % | HEART RATE: 68 BPM | RESPIRATION RATE: 16 BRPM

## 2023-08-24 DIAGNOSIS — Z87.19 PERSONAL HISTORY OF OTHER DISEASES OF THE DIGESTIVE SYSTEM: Chronic | ICD-10-CM

## 2023-08-24 DIAGNOSIS — Z90.89 ACQUIRED ABSENCE OF OTHER ORGANS: Chronic | ICD-10-CM

## 2023-08-24 DIAGNOSIS — R35.0 FREQUENCY OF MICTURITION: ICD-10-CM

## 2023-08-24 DIAGNOSIS — I86.1 SCROTAL VARICES: Chronic | ICD-10-CM

## 2023-08-24 PROCEDURE — 52000 CYSTOURETHROSCOPY: CPT

## 2023-08-24 PROCEDURE — 99214 OFFICE O/P EST MOD 30 MIN: CPT | Mod: 25

## 2023-08-24 RX ORDER — FINASTERIDE 5 MG/1
5 TABLET, FILM COATED ORAL
Qty: 90 | Refills: 3 | Status: ACTIVE | COMMUNITY
Start: 2023-08-24 | End: 1900-01-01

## 2023-08-25 LAB
APPEARANCE: CLEAR
BACTERIA: NEGATIVE /HPF
BILIRUBIN URINE: NEGATIVE
BLOOD URINE: NEGATIVE
CALCIUM OXALATE CRYSTALS: PRESENT
CAST: 1 /LPF
COLOR: NORMAL
EPITHELIAL CELLS: 1 /HPF
GLUCOSE QUALITATIVE U: NEGATIVE MG/DL
KETONES URINE: NEGATIVE MG/DL
LEUKOCYTE ESTERASE URINE: NEGATIVE
MICROSCOPIC-UA: NORMAL
NITRITE URINE: NEGATIVE
PH URINE: 5.5
PROTEIN URINE: NORMAL MG/DL
RED BLOOD CELLS URINE: 4 /HPF
REVIEW: NORMAL
SPECIFIC GRAVITY URINE: 1.02
UROBILINOGEN URINE: 1 MG/DL
WHITE BLOOD CELLS URINE: 0 /HPF

## 2023-08-25 NOTE — ASSESSMENT
[FreeTextEntry1] : I reviewed the CT scan findings with the patient and there is no evidence of obstruction, stone disease, or malignancy within the urinary tract.  I performed a cystoscopy to further assess the bladder and he does have bladder wall trabeculations which I would characterize as moderate to severe.  No bladder diverticuli or stones were present.  He does have a protruding median lobe into the prostate with visible evidence of bladder outlet obstruction.  The median lobe is seen to be hypervascular and well no active bleeding is seen today, I would suspect that this is likely the area that was bleeding with recent hematuria and causing retention.  I have recommended that we start him on finasteride to help reduce the vascularity and size of the prostate gland as this will help lower the risk of recurrent hematuria or retention.  We did discuss that it is possible that he could need surgical intervention if he does develop recurrent retention or hematuria of significance.  He communicates his understanding of this plan and is in agreement.  The patient was provided 2 doses of ciprofloxacin to take for antibiotic prophylaxis post cystoscopy.

## 2023-08-25 NOTE — HISTORY OF PRESENT ILLNESS
[FreeTextEntry1] : Edgar Gaytan returns to the office today.  He is 68 years old and has history of esophageal cancer.  I had seen him in May of this year and he was experiencing symptoms of occasional urinary urgency and nocturia x1.  He has been started on tamsulosin but was told to stop it by his ophthalmologist due to requirement for cataract surgery.  He is more recently on tadalafil 5 mg daily.  For his esophageal cancer he has undergone Pepe David esophagectomy and chemotherapy.  He has been told that he does not have any evidence of disease posttreatment.  He was recently hospitalized related to hematuria and urinary retention and told he had a urinary tract infection.  He did have an elevated white blood cell count but cultures were apparently negative.  It is unclear whether or not they were negative due to use of antibiotics prior to culture.  He has now seen improvement of the hematuria and is here today for follow-up with me for cystoscopy evaluation of the bladder.  He did undergo a CT scan prior to this visit that did not demonstrate any evidence of stones in the urinary tract or any evidence of malignant features within the urinary tract.  He was noted to have prostate enlargement with some mass effect on the bladder.

## 2023-08-25 NOTE — LETTER BODY
[Dear  ___] : Dear  [unfilled], [Courtesy Letter:] : I had the pleasure of seeing your patient, [unfilled], in my office today. [Please see my note below.] : Please see my note below. [FreeTextEntry2] : Víctor Jasmine  Estelita Moore [place] [phone] [FreeTextEntry3] : Sincerely,    Rich Olmos MD, FACS Chief of Urology, [facility]  of Urology  [facility] [address] [place] [address] P: [phone] F: [phone] [facility]

## 2023-08-28 ENCOUNTER — NON-APPOINTMENT (OUTPATIENT)
Age: 68
End: 2023-08-28

## 2023-08-28 DIAGNOSIS — R31.0 GROSS HEMATURIA: ICD-10-CM

## 2023-08-28 DIAGNOSIS — N40.1 BENIGN PROSTATIC HYPERPLASIA WITH LOWER URINARY TRACT SYMPTOMS: ICD-10-CM

## 2023-08-29 DIAGNOSIS — M54.30 SCIATICA, UNSPECIFIED SIDE: ICD-10-CM

## 2023-08-29 DIAGNOSIS — Z90.49 ACQUIRED ABSENCE OF OTHER SPECIFIED PARTS OF DIGESTIVE TRACT: ICD-10-CM

## 2023-08-29 DIAGNOSIS — Z92.21 PERSONAL HISTORY OF ANTINEOPLASTIC CHEMOTHERAPY: ICD-10-CM

## 2023-08-29 DIAGNOSIS — N40.0 BENIGN PROSTATIC HYPERPLASIA WITHOUT LOWER URINARY TRACT SYMPTOMS: ICD-10-CM

## 2023-08-29 DIAGNOSIS — M47.816 SPONDYLOSIS WITHOUT MYELOPATHY OR RADICULOPATHY, LUMBAR REGION: ICD-10-CM

## 2023-08-29 DIAGNOSIS — N32.0 BLADDER-NECK OBSTRUCTION: ICD-10-CM

## 2023-08-29 DIAGNOSIS — I25.10 ATHEROSCLEROTIC HEART DISEASE OF NATIVE CORONARY ARTERY WITHOUT ANGINA PECTORIS: ICD-10-CM

## 2023-08-29 DIAGNOSIS — E04.9 NONTOXIC GOITER, UNSPECIFIED: ICD-10-CM

## 2023-08-29 DIAGNOSIS — Z85.01 PERSONAL HISTORY OF MALIGNANT NEOPLASM OF ESOPHAGUS: ICD-10-CM

## 2023-08-29 DIAGNOSIS — I16.0 HYPERTENSIVE URGENCY: ICD-10-CM

## 2023-08-29 DIAGNOSIS — R91.8 OTHER NONSPECIFIC ABNORMAL FINDING OF LUNG FIELD: ICD-10-CM

## 2023-08-29 DIAGNOSIS — J43.9 EMPHYSEMA, UNSPECIFIED: ICD-10-CM

## 2023-08-31 LAB
BACTERIA UR CULT: NORMAL
URINE CYTOLOGY: NORMAL

## 2023-09-22 ENCOUNTER — APPOINTMENT (OUTPATIENT)
Dept: GASTROENTEROLOGY | Facility: HOSPITAL | Age: 68
End: 2023-09-22

## 2023-10-25 ENCOUNTER — APPOINTMENT (OUTPATIENT)
Dept: GASTROENTEROLOGY | Facility: CLINIC | Age: 68
End: 2023-10-25

## 2023-11-14 ENCOUNTER — RX RENEWAL (OUTPATIENT)
Age: 68
End: 2023-11-14

## 2023-11-14 RX ORDER — OMEPRAZOLE 40 MG/1
40 CAPSULE, DELAYED RELEASE ORAL
Qty: 90 | Refills: 0 | Status: ACTIVE | COMMUNITY
Start: 2023-08-14 | End: 1900-01-01

## 2023-11-24 PROBLEM — R31.0 GROSS HEMATURIA: Status: ACTIVE | Noted: 2023-07-31

## 2023-11-30 ENCOUNTER — APPOINTMENT (OUTPATIENT)
Dept: UROLOGY | Facility: CLINIC | Age: 68
End: 2023-11-30
Payer: MEDICARE

## 2023-11-30 VITALS
WEIGHT: 165 LBS | OXYGEN SATURATION: 98 % | BODY MASS INDEX: 24.44 KG/M2 | HEIGHT: 69 IN | SYSTOLIC BLOOD PRESSURE: 109 MMHG | DIASTOLIC BLOOD PRESSURE: 71 MMHG | HEART RATE: 55 BPM

## 2023-11-30 DIAGNOSIS — R31.0 GROSS HEMATURIA: ICD-10-CM

## 2023-11-30 DIAGNOSIS — E05.90 THYROTOXICOSIS, UNSPECIFIED W/OUT THYROTOXIC CRISIS OR STORM: ICD-10-CM

## 2023-11-30 DIAGNOSIS — J43.9 EMPHYSEMA, UNSPECIFIED: ICD-10-CM

## 2023-11-30 DIAGNOSIS — N13.8 BENIGN PROSTATIC HYPERPLASIA WITH LOWER URINARY TRACT SYMPMS: ICD-10-CM

## 2023-11-30 DIAGNOSIS — C15.9 MALIGNANT NEOPLASM OF ESOPHAGUS, UNSPECIFIED: ICD-10-CM

## 2023-11-30 DIAGNOSIS — N40.1 BENIGN PROSTATIC HYPERPLASIA WITH LOWER URINARY TRACT SYMPMS: ICD-10-CM

## 2023-11-30 DIAGNOSIS — R97.20 ELEVATED PROSTATE, SPECIFIC ANTIGEN [PSA]: ICD-10-CM

## 2023-11-30 PROCEDURE — 99214 OFFICE O/P EST MOD 30 MIN: CPT

## 2023-12-01 LAB
PSA FREE FLD-MCNC: 21 %
PSA FREE SERPL-MCNC: 0.35 NG/ML
PSA SERPL-MCNC: 1.65 NG/ML

## 2023-12-02 RX ORDER — TESTOSTERONE CYPIONATE 100 MG/ML
100 INJECTION, SOLUTION INTRAMUSCULAR
Qty: 10 | Refills: 0 | Status: DISCONTINUED | COMMUNITY
Start: 2021-07-06 | End: 2023-12-02

## 2023-12-02 RX ORDER — AMLODIPINE BESYLATE 5 MG/1
5 TABLET ORAL
Qty: 90 | Refills: 0 | Status: ACTIVE | COMMUNITY
Start: 2023-10-05

## 2023-12-02 RX ORDER — METHIMAZOLE 10 MG/1
10 TABLET ORAL
Qty: 30 | Refills: 0 | Status: ACTIVE | COMMUNITY
Start: 2023-11-07

## 2023-12-02 RX ORDER — PROPRANOLOL HYDROCHLORIDE 10 MG/1
10 TABLET ORAL
Qty: 270 | Refills: 0 | Status: ACTIVE | COMMUNITY
Start: 2023-10-05

## 2023-12-02 RX ORDER — TADALAFIL 2.5 MG/1
2.5 TABLET, FILM COATED ORAL
Qty: 180 | Refills: 0 | Status: ACTIVE | COMMUNITY
Start: 2023-11-02

## 2024-09-05 ENCOUNTER — APPOINTMENT (OUTPATIENT)
Dept: INTERNAL MEDICINE | Facility: CLINIC | Age: 69
End: 2024-09-05
Payer: MEDICARE

## 2024-09-05 VITALS
SYSTOLIC BLOOD PRESSURE: 112 MMHG | BODY MASS INDEX: 26.22 KG/M2 | OXYGEN SATURATION: 99 % | TEMPERATURE: 97.8 F | WEIGHT: 175 LBS | HEART RATE: 69 BPM | DIASTOLIC BLOOD PRESSURE: 75 MMHG | HEIGHT: 68.5 IN

## 2024-09-05 DIAGNOSIS — C15.9 MALIGNANT NEOPLASM OF ESOPHAGUS, UNSPECIFIED: ICD-10-CM

## 2024-09-05 DIAGNOSIS — R93.89 ABNORMAL FINDINGS ON DIAGNOSTIC IMAGING OF OTHER SPECIFIED BODY STRUCTURES: ICD-10-CM

## 2024-09-05 DIAGNOSIS — R91.8 OTHER NONSPECIFIC ABNORMAL FINDING OF LUNG FIELD: ICD-10-CM

## 2024-09-05 DIAGNOSIS — J43.9 EMPHYSEMA, UNSPECIFIED: ICD-10-CM

## 2024-09-05 PROCEDURE — 94726 PLETHYSMOGRAPHY LUNG VOLUMES: CPT

## 2024-09-05 PROCEDURE — 94010 BREATHING CAPACITY TEST: CPT

## 2024-09-05 PROCEDURE — 94729 DIFFUSING CAPACITY: CPT

## 2024-09-05 PROCEDURE — 99203 OFFICE O/P NEW LOW 30 MIN: CPT | Mod: 25

## 2024-09-05 PROCEDURE — G2211 COMPLEX E/M VISIT ADD ON: CPT

## 2024-09-06 PROBLEM — R93.89 ABNORMAL CT OF THE CHEST: Status: ACTIVE | Noted: 2024-09-06

## 2024-09-06 NOTE — ASSESSMENT
[FreeTextEntry1] : History of esophageal cancer Status postsurgical resection Now on clinical trial at [facility] with no evidence of disease recurrence as per patient Former smoker History of COPD PFTs today consistent with minimal airflow obstruction Chest CTs in past have revealed pulmonary nodules Recent CTs from March 2024 and August 2024 with essentially stable pulmonary nodules but apparently with new areas of groundglass opacity and atelectasis ?  Etiology Did have COVID in January 2024 =?  Related to COVID Did have exposure to asbestos with tile removal =?  HSP Did have travel to the [facility] with exposures there =?  Infectious/inflammatory Rule out disease recurrence  Plan to review chest CT CDs with radiology Will closely monitor chest CT and PFTs Suspect that he may require bronchoscopy to obtain deep samples for culture and cytology No smoking or drug use advised Vaccines reviewed Exercise as tolerated Weight control Cardiology follow-up Albuterol MDI as needed Can consider daily Anoro Ellipta He will return in follow-up in 2 weeks and as needed He will call if his status worsens/changes or for any pulmonary issues and return to be seen immediately All of the above was discussed in detail with the patient and his wife All of their questions were answered They verbally confirmed understanding of all of the above and agreement with the above plan

## 2024-09-06 NOTE — ASSESSMENT
[FreeTextEntry1] : History of esophageal cancer Status postsurgical resection Now on clinical trial at [facility] with no evidence of disease recurrence as per patient Former smoker History of COPD PFTs today consistent with minimal airflow obstruction Chest CTs in past have revealed pulmonary nodules Recent CTs from March 2024 and August 2024 with essentially stable pulmonary nodules but apparently with new areas of groundglass opacity and atelectasis ?  Etiology Did have COVID in January 2024 =?  Related to COVID Did have exposure to asbestos with tile removal =?  HSP Did have travel to the [facility] with exposures there =?  Infectious/inflammatory Rule out disease recurrence  Plan to review chest CT CDs with radiology Will closely monitor chest CT and PFTs Suspect that he may require bronchoscopy to obtain deep samples for culture and cytology No smoking or drug use advised Vaccines reviewed Exercise as tolerated Weight control Cardiology follow-up Albuterol MDI as needed Can consider daily Anoro Ellipta He will return in follow-up in 2 weeks and as needed He will call if his status worsens/changes or for any pulmonary issues and return to be seen immediately All of the above was discussed in detail with the patient and his wife All of their questions were answered They verbally confirmed understanding of all of the above and agreement with the above plan

## 2024-09-06 NOTE — HISTORY OF PRESENT ILLNESS
[Former] : former [Never] : never [Wheezing] : wheezing [Nasal Passage Blockage (Stuffiness)] : edema [Nonspecific Pain, Swelling, And Stiffness] : chest pain [Fever] : fever [Difficulty Breathing During Exertion] : dyspnea on exertion [Feelings Of Weakness On Exertion] : exercise intolerance [Cough] : coughing [Does not check] : The patient is not checking peak flow at home [2  -  Slight] : 2, slight [Class II - Mild Symptoms and Slight Limitations] : II [Reviewed no changes] : Reviewed no changes [Designated Healthcare Proxy] : Designated healthcare proxy [Relationship: ___] : Relationship: [unfilled] [Current] : current [Wt Gain ___ kg] : No recent weight gain [Wt Loss ___ kg] : No recent weight loss [More Frequent Use Needed Recently] : Patient reports no recent increase in frequency of [TextBox_4] : Last seen in July 2021.  Never returned for follow-up.  Comes in today for evaluation of abnormal chest imaging. Was diagnosed with esophageal cancer after last visit here.  Underwent surgical resection.  Receives care at [facility].  On a clinical trial there.  Reports no disease recurrence regarding his esophageal cancer. Reportedly has been stable from a pulmonary perspective.  Did have COVID in January 2024. 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 He has also had travel to the [facility] for a family reunion.  The area was very desolate and he did have exposure to wild dogs and scorpions. He reportedly had a follow-up chest CT done in March 2024 which was abnormal.  Repeat imaging in September 2024 revealed similar findings.  He is here today for further evaluation of these CT changes. He denies any chest pain.  He denies any hemoptysis.  He denies any increased shortness of breath or wheezing.  He denies any fevers/chills/sweats.  He denies any weight loss.  He denies any calf pain.  He denies any edema/orthopnea/PND.  He does occasionally notice a cough productive of clear sputum. [ESS] : 0 [TextBox_42] : 08/24 [AdvancecareDate] : 09/24

## 2024-09-06 NOTE — HISTORY OF PRESENT ILLNESS
[Former] : former [Never] : never [Wheezing] : wheezing [Nasal Passage Blockage (Stuffiness)] : edema [Fever] : fever [Nonspecific Pain, Swelling, And Stiffness] : chest pain [Difficulty Breathing During Exertion] : dyspnea on exertion [Feelings Of Weakness On Exertion] : exercise intolerance [Cough] : coughing [Does not check] : The patient is not checking peak flow at home [2  -  Slight] : 2, slight [Class II - Mild Symptoms and Slight Limitations] : II [Reviewed no changes] : Reviewed no changes [Designated Healthcare Proxy] : Designated healthcare proxy [Relationship: ___] : Relationship: [unfilled] [Current] : current [Wt Gain ___ kg] : No recent weight gain [Wt Loss ___ kg] : No recent weight loss [More Frequent Use Needed Recently] : Patient reports no recent increase in frequency of [TextBox_4] : Last seen in July 2021.  Never returned for follow-up.  Comes in today for evaluation of abnormal chest imaging. Was diagnosed with esophageal cancer after last visit here.  Underwent surgical resection.  Receives care at [facility].  On a clinical trial there.  Reports no disease recurrence regarding his esophageal cancer. Reportedly has been stable from a pulmonary perspective.  Did have COVID in January 2024. 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 He has also had travel to the [facility] for a family reunion.  The area was very desolate and he did have exposure to wild dogs and scorpions. He reportedly had a follow-up chest CT done in March 2024 which was abnormal.  Repeat imaging in September 2024 revealed similar findings.  He is here today for further evaluation of these CT changes. He denies any chest pain.  He denies any hemoptysis.  He denies any increased shortness of breath or wheezing.  He denies any fevers/chills/sweats.  He denies any weight loss.  He denies any calf pain.  He denies any edema/orthopnea/PND.  He does occasionally notice a cough productive of clear sputum. [ESS] : 0 [TextBox_42] : 08/24 [AdvancecareDate] : 09/24

## 2024-09-06 NOTE — PHYSICAL EXAM
[No Acute Distress] : no acute distress [Well Nourished] : well nourished [Well Groomed] : well groomed [Well Developed] : well developed [Normal Oropharynx] : normal oropharynx [Supple] : supple [No JVD] : no jvd [Normal Rate/Rhythm] : normal rate/rhythm [Normal S1, S2] : normal s1, s2 [No Resp Distress] : no resp distress [No Acc Muscle Use] : no acc muscle use [Normal Rhythm and Effort] : normal rhythm and effort [Clear to Auscultation Bilaterally] : clear to auscultation bilaterally [No Abnormalities] : no abnormalities [Benign] : benign [Normal Gait] : normal gait [No Clubbing] : no clubbing [No Cyanosis] : no cyanosis [No Edema] : no edema [FROM] : FROM [Normal Color/ Pigmentation] : normal color/ pigmentation [No Focal Deficits] : no focal deficits [Oriented x3] : oriented x3 [Normal Affect] : normal affect [TextBox_11] : No sinus tenderness; pupils reactive to light; extraocular muscles intact; TMs clear [TextBox_44] : No stridor [TextBox_54] : No cords [TextBox_68] : R = 16; normal air entry; no wheezing noted

## 2024-09-06 NOTE — PROCEDURE
[FreeTextEntry1] : See scanned PFTs done today Obstructive airflow pattern but spirometry normal; normal lung volumes; normal gas exchange

## 2024-09-06 NOTE — REASON FOR VISIT
[Initial] : an initial visit [Abnormal CXR/ Chest CT] : an abnormal CXR/ chest CT [Cough] : cough [COPD] : COPD [Spouse] : spouse

## 2024-09-09 NOTE — ED PROVIDER NOTE - GASTROINTESTINAL, MLM
Thank you for choosing us for your  care today.  If you have any questions about your ultrasound or care, please do not hesitate to contact us or your primary obstetrician.        Some general instructions for your pregnancy are:    Exercise: Aim for 150 minutes per week of regular exercise.  Walking is great!  Nutrition: Choose healthy sources of calcium, iron, and protein.  Avoid ultraprocessed foods and added sugar.  Learn about Preeclampsia: preeclampsia is a common, potentially serious high blood pressure complication in pregnancy.  A blood pressure of 140mmHg (systolic or top number) or 90mmHg (diastolic or bottom number) should be evaluated by your doctor.  Aspirin is sometimes prescribed in early pregnancy to prevent preeclampsia in women with risk factors - ask your obstetrician if you should be on this medication.  For more resources, visit:  [email or web address].[email or web address]  If you smoke, please try to quit completely but also try to reduce your smoking by as much as possible (as soon as possible).  Do not vape.  Please also avoid cannabis products.  Other warning signs to watch out for in pregnancy or postpartum: chest pain, obstructed breathing or shortness of breath, seizures, thoughts of hurting yourself or your baby, bleeding, a painful or swollen leg, fever, or headache (see [facility] POST-BIRTH Warning Signs campaign).  If these happen call 911.  Itching is also not normal in pregnancy and if you experience this, especially over your hands and feet, potentially worse at night, notify your doctors.       
Abdomen soft, non-tender, no guarding.

## 2024-09-24 ENCOUNTER — APPOINTMENT (OUTPATIENT)
Dept: INTERNAL MEDICINE | Facility: CLINIC | Age: 69
End: 2024-09-24
Payer: MEDICARE

## 2024-09-24 DIAGNOSIS — R91.8 OTHER NONSPECIFIC ABNORMAL FINDING OF LUNG FIELD: ICD-10-CM

## 2024-09-24 DIAGNOSIS — J43.9 EMPHYSEMA, UNSPECIFIED: ICD-10-CM

## 2024-09-24 DIAGNOSIS — J69.0 PNEUMONITIS DUE TO INHALATION OF FOOD AND VOMIT: ICD-10-CM

## 2024-09-24 DIAGNOSIS — R93.89 ABNORMAL FINDINGS ON DIAGNOSTIC IMAGING OF OTHER SPECIFIED BODY STRUCTURES: ICD-10-CM

## 2024-09-24 PROCEDURE — 99213 OFFICE O/P EST LOW 20 MIN: CPT

## 2024-09-25 PROBLEM — J69.0 ASPIRATION PNEUMONITIS: Status: ACTIVE | Noted: 2024-09-25

## 2024-09-25 PROBLEM — R91.8 GROUND GLASS OPACITY PRESENT ON IMAGING OF LUNG: Status: ACTIVE | Noted: 2024-09-25

## 2024-09-25 NOTE — PHYSICAL EXAM
[No Acute Distress] : no acute distress [Well Nourished] : well nourished [Well Developed] : well developed [Well-Appearing] : well-appearing [Normal Voice/Communication] : normal voice/communication [No Respiratory Distress] : no respiratory distress  [No Accessory Muscle Use] : no accessory muscle use [Speech Grossly Normal] : speech grossly normal [Normal Affect] : the affect was normal [Alert and Oriented x3] : oriented to person, place, and time [de-identified] : R = 16; no audible stridor or wheezing noted

## 2024-09-25 NOTE — HISTORY OF PRESENT ILLNESS
[Spouse] : spouse [FreeTextEntry1] : Pulmonary follow-up telemedicine visit Consent for visit given by patient Patient is at his home/MD in office in [facility] [de-identified] : Advised patient that his chest CT CD was uploaded into our system.  The 2 CTs were reviewed in detail with Dr. Dietz of radiology. He continues to have stable pulmonary nodules.  The preliminary chest CT done in March 2024 revealed new small patchy nodular opacities with areas of atelectasis.  There was evidence of mucous stranding in the mainstem bronchi along the posterior tracheal wall.  These findings were all felt to be infectious and likely related to the patient's COVID 19 infection.  The follow-up chest CT done in August 2024 revealed stable nodules with no new nodules appreciated.  The prior parenchymal changes had improved.  However, in the lingula and in the posterior segment of the right upper lobe there were some new groundglass opacities felt to be infectious or inflammatory in nature.  Dr. Dietz raised the issue of possible recurrent aspiration.  The patient denies any chest pain.  He does have an intermittent cough.  His secretions are clear without hemoptysis.  He denies any fevers or chills.  He denies any shortness of breath.  He denies any dysphagia.  He does not use standing antireflux therapy but only takes it on an intermittent basis.  He reports intermittent reflux symptoms.  He is totally noncompliant with an antireflux diet.  He reports that he eats quickly.  He does try to eat an early dinner but then snacks throughout the evening.  He has even had another meal at 11 PM prior to bedtime.  He does not sleep with the head of his bed elevated.  He tends to sleep on his stomach and on his side.  He states that active GERD with aspiration is certainly a possibility.

## 2024-09-25 NOTE — HEALTH RISK ASSESSMENT
[Yes] : Yes [No] : In the past 12 months have you used drugs other than those required for medical reasons? No [No falls in past year] : Patient reported no falls in the past year [Patient refused screening] : Patient refused screening [de-identified] : Exercises [de-identified] : Regular [Reviewed no changes] : Reviewed, no changes [AdvancecareDate] : 09/24 [Former] : Former

## 2024-09-25 NOTE — ASSESSMENT
[FreeTextEntry1] : Former smoker with history of COPD/emphysema No longer smokes Monitor PFTs Monitor chest CT Vaccines reviewed [place]  History of pulmonary nodules Have been stable on serial chest imaging for years No new nodules noted on most recent imaging  Recent chest CTs have been abnormal With evidence of groundglass changes; atelectasis, secretions in the trachea and mainstem bronchi on the preliminary study which have improved and may have been reflective of prior COVID infection Most recent chest CT with new groundglass changes bilaterally Upon review with radiology issue of possible chronic recurrent aspiration raised Patient noncompliant with PPI therapy, antireflux diet, antireflux precautions At this time we will hold on bronchoscopy The patient will take daily PPI therapy He will maintain an antireflux diet He will eat an early dinner and avoid late meals He will try not to eat for at least 4 to 6 hours prior to bedtime Other antireflux precautions were reviewed He will sleep with the head of his bed elevated He was advised not to sleep on his stomach or side but sleep on his back on a wedge He will do a repeat chest CT within 4 to 6 weeks Hopefully the groundglass changes will resolve if related to aspiration If the changes persist or worsen then bronchoscopy will be pursued at that time He will see me in follow-up in 1 month and as needed He will call if his status changes or worsens or for any pulmonary issues and return to be seen immediately All of the above was discussed in detail with the patient and his wife All of their questions were addressed and answered They verbally confirmed understanding of all of the above and agreement with the above plan

## 2024-10-02 ENCOUNTER — APPOINTMENT (OUTPATIENT)
Dept: NEUROLOGY | Facility: CLINIC | Age: 69
End: 2024-10-02

## 2024-10-21 NOTE — ASU PREOP CHECKLIST - IDENTIFICATION BAND VERIFIED
Medication Refill Request    Carisa Dickson is requesting a refill of the following medication(s):   Requested Prescriptions     Pending Prescriptions Disp Refills    traMADol (ULTRAM) 50 MG tablet 28 tablet 0     Sig: Take 1 tablet by mouth every 6 hours as needed for Pain for up to 7 days. Intended supply: 7 days. Take lowest dose possible to manage pain Max Daily Amount: 200 mg        Listed PCP is Dilcia Jordan MD   Last provider to prescribe medication: Dr. Jordan on 9/22/24  Date of Last Office Visit at [facility]: 8/16/24 with Dr. Jordan    FUTURE APPOINTMENT:   Future Appointments   Date Time Provider Department Center   12/5/2024  8:00 AM Geeta Garduno APRN - NP [facility]       Please send refill to:    [facility] [address] [place] [address] [id number] [place] [address] - P [phone] - F [phone] [address] [place] [id number]  Phone: [phone] Fax: [phone]      Please review request and approve or deny with recommendations within 48 hours.    done

## 2024-10-24 ENCOUNTER — APPOINTMENT (OUTPATIENT)
Dept: UROLOGY | Facility: CLINIC | Age: 69
End: 2024-10-24
Payer: MEDICARE

## 2024-10-24 VITALS
DIASTOLIC BLOOD PRESSURE: 81 MMHG | BODY MASS INDEX: 26.22 KG/M2 | TEMPERATURE: 98 F | HEIGHT: 68.5 IN | SYSTOLIC BLOOD PRESSURE: 137 MMHG | RESPIRATION RATE: 17 BRPM | WEIGHT: 175 LBS | HEART RATE: 73 BPM

## 2024-10-24 DIAGNOSIS — Z87.898 PERSONAL HISTORY OF OTHER SPECIFIED CONDITIONS: ICD-10-CM

## 2024-10-24 DIAGNOSIS — N13.8 BENIGN PROSTATIC HYPERPLASIA WITH LOWER URINARY TRACT SYMPMS: ICD-10-CM

## 2024-10-24 DIAGNOSIS — N40.1 BENIGN PROSTATIC HYPERPLASIA WITH LOWER URINARY TRACT SYMPMS: ICD-10-CM

## 2024-10-24 DIAGNOSIS — Z12.5 ENCOUNTER FOR SCREENING FOR MALIGNANT NEOPLASM OF PROSTATE: ICD-10-CM

## 2024-10-24 PROCEDURE — G2211 COMPLEX E/M VISIT ADD ON: CPT

## 2024-10-24 PROCEDURE — 99214 OFFICE O/P EST MOD 30 MIN: CPT

## 2024-10-26 LAB
PSA FREE FLD-MCNC: 13 %
PSA FREE SERPL-MCNC: 0.17 NG/ML
PSA SERPL-MCNC: 1.32 NG/ML

## 2024-10-27 PROBLEM — Z87.898 HISTORY OF GROSS HEMATURIA: Status: RESOLVED | Noted: 2023-07-31 | Resolved: 2024-10-24

## 2024-11-02 ENCOUNTER — APPOINTMENT (OUTPATIENT)
Dept: CT IMAGING | Facility: CLINIC | Age: 69
End: 2024-11-02

## 2024-11-02 ENCOUNTER — OUTPATIENT (OUTPATIENT)
Dept: OUTPATIENT SERVICES | Facility: HOSPITAL | Age: 69
LOS: 1 days | End: 2024-11-02
Payer: MEDICARE

## 2024-11-02 DIAGNOSIS — Z87.19 PERSONAL HISTORY OF OTHER DISEASES OF THE DIGESTIVE SYSTEM: Chronic | ICD-10-CM

## 2024-11-02 DIAGNOSIS — R91.8 OTHER NONSPECIFIC ABNORMAL FINDING OF LUNG FIELD: ICD-10-CM

## 2024-11-02 DIAGNOSIS — I86.1 SCROTAL VARICES: Chronic | ICD-10-CM

## 2024-11-02 DIAGNOSIS — Z90.89 ACQUIRED ABSENCE OF OTHER ORGANS: Chronic | ICD-10-CM

## 2024-11-02 PROCEDURE — 71250 CT THORAX DX C-: CPT | Mod: 26,MH

## 2024-11-18 ENCOUNTER — APPOINTMENT (OUTPATIENT)
Dept: INTERNAL MEDICINE | Facility: CLINIC | Age: 69
End: 2024-11-18
Payer: MEDICARE

## 2024-11-18 DIAGNOSIS — J43.9 EMPHYSEMA, UNSPECIFIED: ICD-10-CM

## 2024-11-18 DIAGNOSIS — R91.8 OTHER NONSPECIFIC ABNORMAL FINDING OF LUNG FIELD: ICD-10-CM

## 2024-11-18 DIAGNOSIS — R93.89 ABNORMAL FINDINGS ON DIAGNOSTIC IMAGING OF OTHER SPECIFIED BODY STRUCTURES: ICD-10-CM

## 2024-11-18 PROCEDURE — 99213 OFFICE O/P EST LOW 20 MIN: CPT

## 2024-11-20 PROCEDURE — 71250 CT THORAX DX C-: CPT

## 2024-12-03 NOTE — PRE-OP CHECKLIST - SITE MARKED BY SURGEON
[Pre/Post Op Instructions] : pre/post op instructions
[Pre/Post Op Instructions] : pre/post op instructions
n/a

## 2025-03-04 ENCOUNTER — APPOINTMENT (OUTPATIENT)
Dept: INTERNAL MEDICINE | Facility: CLINIC | Age: 70
End: 2025-03-04
Payer: MEDICARE

## 2025-03-04 DIAGNOSIS — R91.8 OTHER NONSPECIFIC ABNORMAL FINDING OF LUNG FIELD: ICD-10-CM

## 2025-03-04 DIAGNOSIS — J43.9 EMPHYSEMA, UNSPECIFIED: ICD-10-CM

## 2025-03-04 DIAGNOSIS — J69.0 PNEUMONITIS DUE TO INHALATION OF FOOD AND VOMIT: ICD-10-CM

## 2025-03-04 DIAGNOSIS — R93.89 ABNORMAL FINDINGS ON DIAGNOSTIC IMAGING OF OTHER SPECIFIED BODY STRUCTURES: ICD-10-CM

## 2025-03-04 PROCEDURE — 99213 OFFICE O/P EST LOW 20 MIN: CPT | Mod: 2W

## 2025-03-04 PROCEDURE — G2211 COMPLEX E/M VISIT ADD ON: CPT | Mod: 2W

## 2025-06-17 NOTE — ED PROVIDER NOTE - IV ALTEPLASE EXCL ABS HIDDEN
Subjective   Patient ID: Truong Armijo is a 70 y.o. male who presents for No chief complaint on file. I hit my head but I am okay.    Virtual Consent: The patient engaged in a telehealth session via Epic audio visual or phone with this provider practicing within the [facility]. The identity of the patient was verified by their date of birth and last four digits of their social security number. The provider demonstrated that confidentially was preserved at their location. The patient was informed that they were responsible for ensuring confidentially was secured at their location. The patient's location was documented for emergency purposes. The patient was informed of the necessary steps that would occur if an emergency was to occur or technology failed during session.   An interactive audio and video telecommunication system which permits real time communications between the patient (at the patient's home) and provider (at the home office) was utilized to provide this telehealth service.   Verbal consent was requested and obtained from Truong Armijo on this date, 06/17/25 for a telehealth visit and the patient's location was confirmed at the time of the visit.    HPI: The patient report many frustrations with multiple endeavors. The patient is managing at this time. He is working maintaining his weight. He is currently trying to resume psychotherapy with a previous therapist. The patient was admonished by a  which upset him today. The patient was able to work through his anger in therapy today. We discussed his medical conditions and managing his conditions and the emotional sequelae. The patient hit his right side of his head but is doing okay.      Active and Past Problems  Problems    · Acute pain of both knees (338.19,719.46) (M25.561,M25.562)   · Adult hypothyroidism (244.9) (E03.9)   · Adverse drug reaction (E947.9) (T50.805A)   · Adverse effect of drug, initial encounter (E947.9)  (T50.905A)   · Anorexia (783.0) (R63.0)   · Anxiety and depression (300.00,311) (F41.9,F32.A)   · Anxiety disorder (300.00) (F41.9)   · Arthralgia of knee, unspecified laterality (719.46) (M25.569)   · Arthralgia of left shoulder region (719.41) (M25.512)   · Arthralgia of right shoulder region (719.41) (M25.511)   · Asthmatic bronchitis (493.90) (J45.909)   · Atelectasis (518.0) (J98.11)   · Atelectasis, bilateral (518.0) (J98.11)   · Atrophic retina (362.60) (H35.89)   · Back pain (724.5) (M54.9)   · Balance problem (781.99) (R26.89)   · Benign essential hypertension (401.1) (I10)   · Bilateral artificial lens implant (V43.1) (Z96.1)   · Bilateral myopia (367.1) (H52.13)   · Bilateral sensorineural hearing loss (389.18) (H90.3)   · Bilateral shoulder pain, unspecified chronicity (719.41) (M25.511,M25.512)   · Cervical back pain with evidence of disc disease (722.91) (M50.90)   · Cervical disc disease (722.91) (M50.90)   · Cervical myofascial pain syndrome (729.1) (M79.18)   · 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)   · Chest pain of uncertain etiology (786.59) (R07.9)   · Chronic depression (311) (F32.A)   · Chronic low self esteem (799.29) (R45.81)   · Chronic otitis externa of left ear (380.23) (H60.62)   · Chronic otitis media (382.9) (H66.90)   · Chronic primary angle-closure glaucoma of both eyes, mild stage (365.23) (H40.2231)   · Chronic primary angle-closure glaucoma of left eye, indeterminate stage (365.23)  (H40.2224)   · Chronic primary angle-closure glaucoma of left eye, unspecified glaucoma stage  (365.23) (H40.2220)   · Constipation (564.00) (K59.00)   · Contusion of lower back (922.31) (S30.0XXA)   · COPD (chronic obstructive pulmonary disease) (496) (J44.9)   · Cough variant asthma (493.82) (J45.991)   · Degeneration of intervertebral disc of cervical region (722.4) (M50.30)   · Depression with anxiety (300.4) (F41.8)   · Diabetes mellitus (250.00) (E11.9)   · Diabetic Charcot's foot (250.60,713.5)  (E11.610)   · Diabetic gastroenteropathy (250.60,579.8) (E11.69,K52.9)   · Diabetic peripheral neuropathy (250.60,357.2) (E11.42)   · Dizziness (780.4) (R42)   · Elbow pain (719.42) (M25.529)   · Elevated IOP (365.00) (H40.059)   · Elevated LDL cholesterol level (272.0) (E78.00)   · Encounter for immunization (V03.89) (Z23)   · Enlarged prostate without lower urinary tract symptoms (luts) (600.00) (N40.0)   · Epididymitis (604.90) (N45.1)   · Fatigue (780.79) (R53.83)   · Fluctuating mental status (780.97) (R41.82)   · Generalized weakness (780.79) (R53.1)   · Glaucoma (365.9) (H40.9)   · High triglycerides (272.1) (E78.1)   · HTN (hypertension) (401.9) (I10)   · Hyperglycemia (790.29) (R73.9)   · Hypogonadism male (257.2) (E29.1)   · Hypotension due to drugs (458.8,E947.9) (I95.2)   · Impaired vision (369.9) (H54.7)   · Increased urinary frequency (788.41) (R35.0)   · Insomnia (780.52) (G47.00)   · Intoxication   · Lateral epicondylitis of right elbow (726.32) (M77.11)   · Leg weakness (729.89) (R29.898)   · Lumbar disc herniation (722.10) (M51.26)   · Lumbar pain (724.2) (M54.50)   · Medication management (V58.69) (Z79.899)   · Migraine equivalent (346.20) (G43.109)   · Mixed hearing loss (389.20) (H90.8)   · Moderate episode of recurrent major depressive disorder (296.32) (F33.1)   · Neck pain (723.1) (M54.2)   · Neck pain, acute (723.1) (M54.2)   · Neurogenic bladder (596.54) (N31.9)   · 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,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.11)   · Optic nerve hemorrhage (377.42) (H47.029)   · 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)   · Other specified hypotension (458.8) (I95.89)   · Pain in both feet (729.5) (M79.671,M79.672)   · Paranoia (psychosis) (297.1) (F22)   · Paranoid ideation (297.8) (F22)   · Penile pain, chronic (607.9) (N48.89,G89.29)   · Peripheral neuropathy (356.9) (G62.9)   · Persistent testicular pain (608.9) (N50.819)   · Poorly controlled diabetes mellitus (250.00) (E11.65)   · Post traumatic  stress disorder (PTSD) (309.81) (F43.10)   · Primary hypothyroidism (244.9) (E03.9)   · Primary open angle glaucoma of both eyes, mild stage (365.11,365.71) (H40.1131)   · Primary osteoarthritis of left knee (715.16) (M17.12)   · Primary osteoarthritis of right knee (715.16) (M17.11)   · PUD (peptic ulcer disease) (533.90) (K27.9)   · Reactive confusion (298.2) (F44.89)   · Reflux esophagitis (530.11) (K21.00)   · Right inguinal hernia (550.90) (K40.90)   · Schizoaffective disorder (295.70) (F25.9)   · Screening PSA (prostate specific antigen) (V76.44) (Z12.5)   · SOB (shortness of breath) (786.05) (R06.02)   · SOB (shortness of breath) (786.05) (R06.02)   · Sprain of right elbow, initial encounter (841.9) (S53.401A)   · Stress reaction (308.9) (F43.0)   · Subjective tinnitus of both ears (388.31) (H93.13)   · 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)   · Thyroid disorder (246.9) (E07.9)   · TIA (transient ischemic attack) (435.9) (G45.9)   · Trigeminal neuralgia (350.1) (G50.0)   · Trigger point of extremity (729.5) (M79.609)   · Urinary frequency (788.41) (R35.0)   · Urinary tract infection (599.0) (N39.0)   · Urinary urgency (788.63) (R39.15)   · Vasovagal reaction (780.2) (R55)   · Vertigo (780.4) (R42)   · Vitamin D deficiency (268.9) (E55.9)     Past Medical History  Problems    · History of Acute bilateral otitis media (382.9) (H66.93)   · Resolved Date: 15 Oct 2015   · History of Change in bowel habits (787.99) (R19.4)   · Resolved Date: 09 Dec 2021   · History of Change in mental status (780.97) (R41.82)   · Resolved Date: 17 Sep 2019   · History of Chronic diarrhea of unknown origin (787.91) (K52.9)   · Resolved Date: 09 Dec 2021   · History of Depression with anxiety (300.4) (F41.8)   · Resolved Date: 03 Nov 2017   · History of Depression with anxiety (300.4) (F41.8)   · Resolved Date: 13 May 2020   · History of Depression with anxiety (300.4) (F41.8)   · Resolved Date: 02 Jul 2021   · History of Dysphagia,  oropharyngeal phase (787.22) (R13.12)   · Resolved Date: 09 Dec 2021   · History of acute otitis media (V12.49) (Z86.69)   · Resolved Date: 15 Oct 2015   · History of acute pharyngitis (V12.69) (Z87.09)   · Resolved Date: 15 Oct 2015   · History of acute sinusitis (V12.69) (Z87.09)   · Resolved Date: 15 Oct 2015   · History of chronic diarrhea   · Resolved Date: 09 Dec 2021   · History of dysphagia (V13.89) (Z87.898)   · Resolved Date: 09 Dec 2021   · History of dysphagia (V13.89) (Z87.898)   · Resolved Date: 09 Dec 2021   · History of fatigue (V13.89) (Z87.898)   · Resolved Date: 28 Apr 2014   · History of hypercholesterolemia (V12.29) (Z86.39)   · Resolved Date: 12 Oct 2016   · History of nausea (V12.79) (Z87.898)   · Resolved Date: 09 Dec 2021   · History of nausea (V12.79) (Z87.898)   · Resolved Date: 09 Dec 2021   · History of nausea and vomiting (V12.79) (Z87.898)   · Resolved Date: 09 Dec 2021   · History of psychiatric hospitalization (V11.9) (Z86.59)   · Several psychiatric hospitalizations including when in his early 20's he was diagnosed      with disorganized schizophrenia.   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.   · Completed elementary school   · Completed high school   · Former smoker (V15.82) (Z87.891)   · Heterosexual   · No drug use   · Retired   · Retired from taxi driving for about 20 years. He did work for [employer] prior to driving a taxi.   · Single     Mental Status Exam     General: In no acute distress with depressed mood.   Appearance: Exacerbation of chronic depressive features with poor self esteem.   Attitude: Cooperative.   Behavior: Exacerbation of chronic depressive features with poor self esteem.   Motor Activity: No agitation or retardation. No EPS/TD. Normal gait and station.   Speech: Regular rate, rhythm, volume and tone, spontaneous, fluent.   Mood: Exacerbation of chronic depressive features with poor self esteem.   Affect: Exacerbation of chronic depressive features with poor self esteem.   Thought Process: Exacerbation of chronic depressive features with poor self esteem.   Thought Content: Exacerbation of chronic depressive features with poor self esteem.   Thought Perception: Thought disturbance with chronic depressive features with poor self esteem.   Cognition: Thought disturbance with chronic depressive features with poor self esteem.   Insight: Insight limitations.   Judgment: Judgment limitations.       Appearance: well-groomed.   Build: average.   Demeanor: average.   Eye Contact: average.   Motor Activity: average.   Speech: clear.   Language: Neurologic language is intact.   Fund of Knowledge: intact fund of knowledge.   Delusions: None Reported.   Self Harm: None Reported.   Aggressive: None Reported.   Mood: depressed and anxious . Exacerbation of chronic depressive features with poor self  esteem.   Affect: labile . Exacerbation of chronic depressive features with poor self esteem.   Orientation: alert, oriented x3.   Manner: cooperative.   Thought process: goal-directed.   Thought association: displays rational thought process.   Content of thought: Mr. HANNY HILL denies any suicidal or homicidal ideation or plans.   Abstract/ Rational Thought: minimal impairment   Memory: grossly intact.   Behavior: calm.   Attention/Concentration: normal.   Cognition: intact.   Intelligence Estimate: average.   Executive Function: intact.   Insight: minimal impairment.   Judgement: minimal impairment.         Review of Systems   Constitutional:  Positive for fatigue.   Neurological:         Mental Status Exam     General: In no acute distress with depressed mood.   Appearance: Exacerbation of chronic depressive features with poor self esteem.   Attitude: Cooperative.   Behavior: Exacerbation of chronic depressive features with poor self esteem.   Motor Activity: No agitation or retardation. No EPS/TD. Normal gait and station.   Speech: Regular rate, rhythm, volume and tone, spontaneous, fluent.   Mood: Exacerbation of chronic depressive features with poor self esteem.   Affect: Exacerbation of chronic depressive features with poor self esteem.   Thought Process: Exacerbation of chronic depressive features with poor self esteem.   Thought Content: Exacerbation of chronic depressive features with poor self esteem.   Thought Perception: Thought disturbance with chronic depressive features with poor self esteem.   Cognition: Thought disturbance with chronic depressive features with poor self esteem.   Insight: Insight limitations.   Judgment: Judgment limitations.       Appearance: well-groomed.   Build: average.   Demeanor: average.   Eye Contact: average.   Motor Activity: average.   Speech: clear.   Language: Neurologic language is intact.   Fund of Knowledge: intact fund of knowledge.   Delusions: None Reported.    Self Harm: None Reported.   Aggressive: None Reported.   Mood: depressed and anxious . Exacerbation of chronic depressive features with poor self esteem.   Affect: labile . Exacerbation of chronic depressive features with poor self esteem.   Orientation: alert, oriented x3.   Manner: cooperative.   Thought process: goal-directed.   Thought association: displays rational thought process.   Content of thought: Mr. HANNY HILL denies any suicidal or homicidal ideation or plans.   Abstract/ Rational Thought: minimal impairment   Memory: grossly intact.   Behavior: calm.   Attention/Concentration: normal.   Cognition: intact.   Intelligence Estimate: average.   Executive Function: intact.   Insight: minimal impairment.   Judgement: minimal impairment.        Psychiatric/Behavioral:  Positive for dysphoric mood. The patient is nervous/anxious.         Mental Status Exam     General: In no acute distress with depressed mood.   Appearance: Exacerbation of chronic depressive features with poor self esteem.   Attitude: Cooperative.   Behavior: Exacerbation of chronic depressive features with poor self esteem.   Motor Activity: No agitation or retardation. No EPS/TD. Normal gait and station.   Speech: Regular rate, rhythm, volume and tone, spontaneous, fluent.   Mood: Exacerbation of chronic depressive features with poor self esteem.   Affect: Exacerbation of chronic depressive features with poor self esteem.   Thought Process: Exacerbation of chronic depressive features with poor self esteem.   Thought Content: Exacerbation of chronic depressive features with poor self esteem.   Thought Perception: Thought disturbance with chronic depressive features with poor self esteem.   Cognition: Thought disturbance with chronic depressive features with poor self esteem.   Insight: Insight limitations.   Judgment: Judgment limitations.       Appearance: well-groomed.   Build: average.   Demeanor: average.   Eye Contact: average.    Motor Activity: average.   Speech: clear.   Language: Neurologic language is intact.   Fund of Knowledge: intact fund of knowledge.   Delusions: None Reported.   Self Harm: None Reported.   Aggressive: None Reported.   Mood: depressed and anxious . Exacerbation of chronic depressive features with poor self esteem.   Affect: labile . Exacerbation of chronic depressive features with poor self esteem.   Orientation: alert, oriented x3.   Manner: cooperative.   Thought process: goal-directed.   Thought association: displays rational thought process.   Content of thought: Mr. HANNY HILL denies any suicidal or homicidal ideation or plans.   Abstract/ Rational Thought: minimal impairment   Memory: grossly intact.   Behavior: calm.   Attention/Concentration: normal.   Cognition: intact.   Intelligence Estimate: average.   Executive Function: intact.   Insight: minimal impairment.   Judgement: minimal impairment.          Psych Review of Symptoms:    ADHD: Patient denied any symptoms.         Anxiety:   Generalized Anxiety Symptoms: Difficulty controlling worry.       Developmental and Sensory Concerns: Patient denied any symptoms.         Depressive Symptoms:   Depressed mood, decreased interest and fatigue.       Disruptive and Conduct Symptoms: Patient denied any symptoms.         Eating / Feeding Concerns: Patient denied any symptoms.         Elimination Symptoms: Patient denied any symptoms.         Manic Symptoms: Patient denied any symptoms.         Obsessive-Compulsive Symptoms: Patient denied any symptoms.         Psychotic Symptoms: Patient denied any symptoms.           Trauma Related Symptoms: Patient denied any symptoms.           Sleep Concerns: Patient denied any symptoms.             Objective   Physical Exam  Neurological:      Mental Status: He is alert and oriented to person, place, and time.      Comments: Mental Status Exam     General: In no acute distress with depressed mood.   Appearance:  Exacerbation of chronic depressive features with poor self esteem.   Attitude: Cooperative.   Behavior: Exacerbation of chronic depressive features with poor self esteem.   Motor Activity: No agitation or retardation. No EPS/TD. Normal gait and station.   Speech: Regular rate, rhythm, volume and tone, spontaneous, fluent.   Mood: Exacerbation of chronic depressive features with poor self esteem.   Affect: Exacerbation of chronic depressive features with poor self esteem.   Thought Process: Exacerbation of chronic depressive features with poor self esteem.   Thought Content: Exacerbation of chronic depressive features with poor self esteem.   Thought Perception: Thought disturbance with chronic depressive features with poor self esteem.   Cognition: Thought disturbance with chronic depressive features with poor self esteem.   Insight: Insight limitations.   Judgment: Judgment limitations.       Appearance: well-groomed.   Build: average.   Demeanor: average.   Eye Contact: average.   Motor Activity: average.   Speech: clear.   Language: Neurologic language is intact.   Fund of Knowledge: intact fund of knowledge.   Delusions: None Reported.   Self Harm: None Reported.   Aggressive: None Reported.   Mood: depressed and anxious . Exacerbation of chronic depressive features with poor self esteem.   Affect: labile . Exacerbation of chronic depressive features with poor self esteem.   Orientation: alert, oriented x3.   Manner: cooperative.   Thought process: goal-directed.   Thought association: displays rational thought process.   Content of thought: Mr. HANNY HILL denies any suicidal or homicidal ideation or plans.   Abstract/ Rational Thought: minimal impairment   Memory: grossly intact.   Behavior: calm.   Attention/Concentration: normal.   Cognition: intact.   Intelligence Estimate: average.   Executive Function: intact.   Insight: minimal impairment.   Judgement: minimal impairment.        Psychiatric:       Comments: Mental Status Exam     General: In no acute distress with depressed mood.   Appearance: Exacerbation of chronic depressive features with poor self esteem.   Attitude: Cooperative.   Behavior: Exacerbation of chronic depressive features with poor self esteem.   Motor Activity: No agitation or retardation. No EPS/TD. Normal gait and station.   Speech: Regular rate, rhythm, volume and tone, spontaneous, fluent.   Mood: Exacerbation of chronic depressive features with poor self esteem.   Affect: Exacerbation of chronic depressive features with poor self esteem.   Thought Process: Exacerbation of chronic depressive features with poor self esteem.   Thought Content: Exacerbation of chronic depressive features with poor self esteem.   Thought Perception: Thought disturbance with chronic depressive features with poor self esteem.   Cognition: Thought disturbance with chronic depressive features with poor self esteem.   Insight: Insight limitations.   Judgment: Judgment limitations.       Appearance: well-groomed.   Build: average.   Demeanor: average.   Eye Contact: average.   Motor Activity: average.   Speech: clear.   Language: Neurologic language is intact.   Fund of Knowledge: intact fund of knowledge.   Delusions: None Reported.   Self Harm: None Reported.   Aggressive: None Reported.   Mood: depressed and anxious . Exacerbation of chronic depressive features with poor self esteem.   Affect: labile . Exacerbation of chronic depressive features with poor self esteem.   Orientation: alert, oriented x3.   Manner: cooperative.   Thought process: goal-directed.   Thought association: displays rational thought process.   Content of thought: Mr. HANNY HILL denies any suicidal or homicidal ideation or plans.   Abstract/ Rational Thought: minimal impairment   Memory: grossly intact.   Behavior: calm.   Attention/Concentration: normal.   Cognition: intact.   Intelligence Estimate: average.   Executive Function:  intact.   Insight: minimal impairment.   Judgement: minimal impairment.              Lab Review:   Ancillary Procedure on 05/14/2025   Component Date Value    AV pk madyson 05/14/2025 1.35     AV mn grad 05/14/2025 4     LVOT diam 05/14/2025 2.13     MV E/A ratio 05/14/2025 0.90     LA vol index A/L 05/14/2025 15.6     Tricuspid annular plane * 05/14/2025 2.6     LV EF 05/14/2025 58     RV free wall pk S' 05/14/2025 12.00     LVIDd 05/14/2025 3.70     Aortic Valve Area by Con* 05/14/2025 2.91     Aortic Valve Area by Con* 05/14/2025 2.89     AV pk grad 05/14/2025 7     LV A4C EF 05/14/2025 61.1    Office Visit on 04/14/2025   Component Date Value    HEMOGLOBIN A1c 05/12/2025 5.9 (H)     eAG (mg/dL) 05/12/2025 123     eAG (mmol/L) 05/12/2025 6.8     CHOLESTEROL, TOTAL 05/12/2025 131     HDL CHOLESTEROL 05/12/2025 46     TRIGLYCERIDES 05/12/2025 92     LDL-CHOLESTEROL 05/12/2025 67     CHOL/HDLC RATIO 05/12/2025 2.8     NON HDL CHOLESTEROL 05/12/2025 85     GLUCOSE 05/12/2025 129 (H)     UREA NITROGEN (BUN) 05/12/2025 19     CREATININE 05/12/2025 0.88     EGFR 05/12/2025 91     SODIUM 05/12/2025 140     POTASSIUM 05/12/2025 4.2     CHLORIDE 05/12/2025 103     CARBON DIOXIDE 05/12/2025 24     ELECTROLYTE BALANCE 05/12/2025 13     CALCIUM 05/12/2025 9.8     PROTEIN, TOTAL 05/12/2025 7.1     ALBUMIN 05/12/2025 4.6     BILIRUBIN, TOTAL 05/12/2025 1.0     ALKALINE PHOSPHATASE 05/12/2025 33 (L)     AST 05/12/2025 14     ALT 05/12/2025 13     WHITE BLOOD CELL COUNT 05/12/2025 8.6     RED BLOOD CELL COUNT 05/12/2025 4.77     HEMOGLOBIN 05/12/2025 14.7     HEMATOCRIT 05/12/2025 45.7     MCV 05/12/2025 95.8     MCH 05/12/2025 30.8     MCHC 05/12/2025 32.2     RDW 05/12/2025 12.3     PLATELET COUNT 05/12/2025 240     MPV 05/12/2025 10.3     ABSOLUTE NEUTROPHILS 05/12/2025 6,132     ABSOLUTE LYMPHOCYTES 05/12/2025 1,359     ABSOLUTE MONOCYTES 05/12/2025 929     ABSOLUTE EOSINOPHILS 05/12/2025 112     ABSOLUTE BASOPHILS 05/12/2025 69      NEUTROPHILS 05/12/2025 71.3     LYMPHOCYTES 05/12/2025 15.8     MONOCYTES 05/12/2025 10.8     EOSINOPHILS 05/12/2025 1.3     BASOPHILS 05/12/2025 0.8     PSA, TOTAL 05/12/2025 3.52     TSH W/REFLEX TO FT4 05/12/2025 1.07    Lab on 12/18/2024   Component Date Value    WBC 12/18/2024 8.4     nRBC 12/18/2024 0.0     RBC 12/18/2024 5.02     Hemoglobin 12/18/2024 15.3     Hematocrit 12/18/2024 46.5     MCV 12/18/2024 93     MCH 12/18/2024 30.5     MCHC 12/18/2024 32.9     RDW 12/18/2024 13.5     Platelets 12/18/2024 264     Neutrophils % 12/18/2024 62.8     Immature Granulocytes %,* 12/18/2024 0.4     Lymphocytes % 12/18/2024 24.2     Monocytes % 12/18/2024 10.3     Eosinophils % 12/18/2024 1.7     Basophils % 12/18/2024 0.6     Neutrophils Absolute 12/18/2024 5.29     Immature Granulocytes Ab* 12/18/2024 0.03     Lymphocytes Absolute 12/18/2024 2.04     Monocytes Absolute 12/18/2024 0.87 (H)     Eosinophils Absolute 12/18/2024 0.14     Basophils Absolute 12/18/2024 0.05     Glucose 12/18/2024 136 (H)     Sodium 12/18/2024 140     Potassium 12/18/2024 4.3     Chloride 12/18/2024 102     Bicarbonate 12/18/2024 30     Anion Gap 12/18/2024 12     Urea Nitrogen 12/18/2024 24 (H)     Creatinine 12/18/2024 1.05     eGFR 12/18/2024 75     Calcium 12/18/2024 10.0     Albumin 12/18/2024 4.6     Alkaline Phosphatase 12/18/2024 32 (L)     Total Protein 12/18/2024 7.0     AST 12/18/2024 10     Bilirubin, Total 12/18/2024 1.0     ALT 12/18/2024 15     Cholesterol 12/18/2024 120     HDL-Cholesterol 12/18/2024 40.8     Cholesterol/HDL Ratio 12/18/2024 2.9     LDL Calculated 12/18/2024 57     VLDL 12/18/2024 22     Triglycerides 12/18/2024 110     Non HDL Cholesterol 12/18/2024 79     Hemoglobin A1C 12/18/2024 5.3     Estimated Average Glucose 12/18/2024 105        Assessment/Plan   Psychiatric Risk Assessment  Violence Risk Assessment: none  Acute Risk of Harm to Others is Considered: low   Suicide Risk Assessment: age > 65 yrs old  and   Protective Factors against Suicide: adherence to  treatment, fear of suicide, moral objections to suicide, positive family relationships, and sense of responsibility toward family  Acute Risk of Harm to Self is Considered: low    Imminent Risk of Suicide or Serious Self-Injury: Low   Chronic Risk of Suicide of Serious Self-Injury: Low  Risk factors: Age, depression history and   Protective factors: Denies current suicidal ideation, denies history of suicide attempts , willingness to seek help and support , gender, access to a variety of clinical interventions , and receiving and engaged in care for mental, physical, and substance use disorders      Imminent Risk of Violence or Homicide: Low   Risk Factors: No significant risk factors identified on screening  Protective Factors: Lack of known history of harm to others , Lack of known history of violent ideation , and lack of known access to firearms.     Assessment & Plan  Mixed anxiety depressive disorder  Your diagnosis is of chronic depressive and anxious features. You deny any suicidal or homicidal ideation or plans.      [place] was reviewed today, 6/17/25 with no concerning findings evidenced.     The plan is to continue bupropion  mg morning and middle afternoon daily, you have elected to stop risperidone 1 mg twice daily in the morning and late afternoon which you announced you have done unilaterally prior to this appointment because of concerns of parkinsonian features/risks; continue fluoxetine to 40 mg daily as well as bupropion  mg  twice daily for depression and you can continue diazepam 5 mg as needed daily and at bedtime as needed only. You clearly are able to recite back the risks, benefits and alternatives of these medications as discussed with you today.     The FDA risks of antipsychotics including increased risk of sudden death, heart attack, brain stroke, irreversible neurological movement disorders, parkinsonism, cardiac  toxicity, weight gain and diabetes of this medication were discussed. You were able to recite back the risks, benefits, alternatives with respect to antipsychotics and in particular risperidone.     The FDA risks of antidepressants including increased risk of suicide, cardiotoxicity, lowered seizure threshold, the serotonin syndrome with serotonin agents and anticholinergic effects have been discussed. We also discussed that at least in the case of SSRIs there is interference with warfarin and nonsteroidal inflammatories and can cause increased bleeding and hemorrhage. You were able to recite back the risks, benefits, alternatives with respect to your antidepressants in particular bupropion and fluoxetine.     The FDA risks of benzodiazepines were discussed which includes impairment of memory and cognition, increased falls, addiction and dangerous withdrawals if stopped suddenly which can cause significant morbidity and or mortality. There is also risk of respiratory suppression alone or with other sedative or other medications which can lead to morbidity and mortality. You clearly were able to recite back the risks, benefits and alternatives to the benzodiazepines discussed.   Orders:    diazePAM (Valium) 5 mg tablet; Take 1 tablet (5 mg) by mouth as needed at bedtime for anxiety.    Continue to follow up monthly.  Time:   Prep time on date of the patient encounter: 5 minutes.   Time spent directly with patient/family/caregiver: 20 minutes.   Additional time spent on patient care activities:  minutes.   Documentation time: 5 minutes.   Total time on date of patient encounter: 30 minutes.           show

## 2025-07-24 NOTE — H&P ADULT - NSCORESITESY/N_GEN_A_CORE_RD
Could the individual who saw the patient on 7/1, please sign the note.      Thank you for your time.    
Yes